# Patient Record
Sex: MALE | Race: WHITE | NOT HISPANIC OR LATINO
[De-identification: names, ages, dates, MRNs, and addresses within clinical notes are randomized per-mention and may not be internally consistent; named-entity substitution may affect disease eponyms.]

---

## 2017-01-09 ENCOUNTER — APPOINTMENT (OUTPATIENT)
Dept: CARDIOLOGY | Facility: CLINIC | Age: 72
End: 2017-01-09

## 2017-01-19 ENCOUNTER — APPOINTMENT (OUTPATIENT)
Dept: CARDIOLOGY | Facility: CLINIC | Age: 72
End: 2017-01-19

## 2017-03-15 ENCOUNTER — APPOINTMENT (OUTPATIENT)
Dept: CARDIOLOGY | Facility: CLINIC | Age: 72
End: 2017-03-15

## 2017-06-16 ENCOUNTER — APPOINTMENT (OUTPATIENT)
Dept: CARDIOLOGY | Facility: CLINIC | Age: 72
End: 2017-06-16

## 2017-10-06 ENCOUNTER — APPOINTMENT (OUTPATIENT)
Dept: CARDIOLOGY | Facility: CLINIC | Age: 72
End: 2017-10-06
Payer: COMMERCIAL

## 2017-10-06 PROCEDURE — 93280 PM DEVICE PROGR EVAL DUAL: CPT

## 2018-01-05 ENCOUNTER — APPOINTMENT (OUTPATIENT)
Dept: CARDIOLOGY | Facility: CLINIC | Age: 73
End: 2018-01-05

## 2018-01-17 ENCOUNTER — APPOINTMENT (OUTPATIENT)
Dept: CARDIOLOGY | Facility: CLINIC | Age: 73
End: 2018-01-17
Payer: COMMERCIAL

## 2018-01-17 VITALS
DIASTOLIC BLOOD PRESSURE: 64 MMHG | HEIGHT: 73 IN | HEART RATE: 67 BPM | WEIGHT: 174 LBS | BODY MASS INDEX: 23.06 KG/M2 | OXYGEN SATURATION: 95 % | SYSTOLIC BLOOD PRESSURE: 102 MMHG

## 2018-01-17 DIAGNOSIS — Z82.49 FAMILY HISTORY OF ISCHEMIC HEART DISEASE AND OTHER DISEASES OF THE CIRCULATORY SYSTEM: ICD-10-CM

## 2018-01-17 DIAGNOSIS — Z78.9 OTHER SPECIFIED HEALTH STATUS: ICD-10-CM

## 2018-01-17 DIAGNOSIS — Z80.1 FAMILY HISTORY OF MALIGNANT NEOPLASM OF TRACHEA, BRONCHUS AND LUNG: ICD-10-CM

## 2018-01-17 DIAGNOSIS — Z80.0 FAMILY HISTORY OF MALIGNANT NEOPLASM OF DIGESTIVE ORGANS: ICD-10-CM

## 2018-01-17 PROCEDURE — 99214 OFFICE O/P EST MOD 30 MIN: CPT

## 2018-01-19 ENCOUNTER — APPOINTMENT (OUTPATIENT)
Dept: CARDIOLOGY | Facility: CLINIC | Age: 73
End: 2018-01-19
Payer: COMMERCIAL

## 2018-01-19 PROCEDURE — 93306 TTE W/DOPPLER COMPLETE: CPT

## 2018-01-24 ENCOUNTER — APPOINTMENT (OUTPATIENT)
Dept: CARDIOLOGY | Facility: CLINIC | Age: 73
End: 2018-01-24

## 2018-01-25 ENCOUNTER — APPOINTMENT (OUTPATIENT)
Dept: CARDIOLOGY | Facility: CLINIC | Age: 73
End: 2018-01-25
Payer: COMMERCIAL

## 2018-01-25 PROCEDURE — 93280 PM DEVICE PROGR EVAL DUAL: CPT

## 2018-04-24 ENCOUNTER — APPOINTMENT (OUTPATIENT)
Dept: CARDIOLOGY | Facility: CLINIC | Age: 73
End: 2018-04-24
Payer: COMMERCIAL

## 2018-04-24 PROCEDURE — 93280 PM DEVICE PROGR EVAL DUAL: CPT

## 2018-04-24 RX ORDER — UBIDECARENONE/VIT E ACET 100MG-5
1000 CAPSULE ORAL
Refills: 0 | Status: ACTIVE | COMMUNITY

## 2018-05-29 ENCOUNTER — MEDICATION RENEWAL (OUTPATIENT)
Age: 73
End: 2018-05-29

## 2018-07-09 ENCOUNTER — MEDICATION RENEWAL (OUTPATIENT)
Age: 73
End: 2018-07-09

## 2018-07-09 ENCOUNTER — RECORD ABSTRACTING (OUTPATIENT)
Age: 73
End: 2018-07-09

## 2018-08-01 ENCOUNTER — APPOINTMENT (OUTPATIENT)
Dept: CARDIOLOGY | Facility: CLINIC | Age: 73
End: 2018-08-01
Payer: COMMERCIAL

## 2018-08-01 VITALS
BODY MASS INDEX: 23.86 KG/M2 | WEIGHT: 180 LBS | HEIGHT: 73 IN | SYSTOLIC BLOOD PRESSURE: 110 MMHG | DIASTOLIC BLOOD PRESSURE: 64 MMHG | HEART RATE: 68 BPM

## 2018-08-01 PROCEDURE — 99214 OFFICE O/P EST MOD 30 MIN: CPT

## 2018-08-01 PROCEDURE — 93280 PM DEVICE PROGR EVAL DUAL: CPT

## 2018-08-01 RX ORDER — ATENOLOL 50 MG/1
50 TABLET ORAL DAILY
Qty: 90 | Refills: 2 | Status: DISCONTINUED | COMMUNITY
Start: 2018-07-09 | End: 2018-08-01

## 2018-08-01 RX ORDER — ATORVASTATIN CALCIUM 40 MG/1
40 TABLET, FILM COATED ORAL DAILY
Qty: 90 | Refills: 1 | Status: DISCONTINUED | COMMUNITY
End: 2018-08-01

## 2018-10-15 ENCOUNTER — MEDICATION RENEWAL (OUTPATIENT)
Age: 73
End: 2018-10-15

## 2018-10-29 ENCOUNTER — RX RENEWAL (OUTPATIENT)
Age: 73
End: 2018-10-29

## 2018-11-05 ENCOUNTER — MEDICATION RENEWAL (OUTPATIENT)
Age: 73
End: 2018-11-05

## 2018-11-06 RX ORDER — ALLOPURINOL 100 MG/1
100 TABLET ORAL DAILY
Qty: 30 | Refills: 0 | Status: ACTIVE | COMMUNITY
Start: 1900-01-01 | End: 1900-01-01

## 2018-11-09 ENCOUNTER — APPOINTMENT (OUTPATIENT)
Dept: CARDIOLOGY | Facility: CLINIC | Age: 73
End: 2018-11-09
Payer: COMMERCIAL

## 2018-11-09 PROCEDURE — 93280 PM DEVICE PROGR EVAL DUAL: CPT

## 2018-11-09 NOTE — PROCEDURE
[Pacemaker] : pacemaker [DDDR] : DDDR [Threshold Testing Performed] : Threshold testing was performed [Lead Imp:  ___ohms] : lead impedance was [unfilled] ohms [Sensing Amplitude ___mv] : sensing amplitude was [unfilled] mv [___V @] : [unfilled] V [___ ms] : [unfilled] ms [Counters Reset] : the counters were reset [de-identified] : [facility] [de-identified] : Iron [id number] [de-identified] : 3/4/13 [de-identified] :  [de-identified] : 10 months [de-identified] : AP: 43%\par : 100%\par \par 1 recorded HVR, at a rate of 120bpm. Patient  asymptomatic. \par 1:1 AV conduction\par \par PPM interrogation in 3 months\par \par \par Sincerely,\par \par Suyapa Brown PA-C\par Supervising MD: Dr. Radha Mills

## 2018-12-05 ENCOUNTER — TRANSCRIPTION ENCOUNTER (OUTPATIENT)
Age: 73
End: 2018-12-05

## 2018-12-05 ENCOUNTER — MEDICATION RENEWAL (OUTPATIENT)
Age: 73
End: 2018-12-05

## 2019-01-16 ENCOUNTER — APPOINTMENT (OUTPATIENT)
Dept: CARDIOLOGY | Facility: CLINIC | Age: 74
End: 2019-01-16

## 2019-02-12 ENCOUNTER — APPOINTMENT (OUTPATIENT)
Dept: CARDIOLOGY | Facility: CLINIC | Age: 74
End: 2019-02-12
Payer: COMMERCIAL

## 2019-02-12 PROCEDURE — 93280 PM DEVICE PROGR EVAL DUAL: CPT

## 2019-02-12 PROCEDURE — 93306 TTE W/DOPPLER COMPLETE: CPT

## 2019-02-12 NOTE — PROCEDURE
[Pacemaker] : pacemaker [DDDR] : DDDR [Threshold Testing Performed] : Threshold testing was performed [Lead Imp:  ___ohms] : lead impedance was [unfilled] ohms [Sensing Amplitude ___mv] : sensing amplitude was [unfilled] mv [___V @] : [unfilled] V [___ ms] : [unfilled] ms [Counters Reset] : the counters were reset [de-identified] : [facility] [de-identified] : Iron K173 [de-identified] : [id number] [de-identified] : 3/4/13 [de-identified] :  [de-identified] : 5 months [de-identified] : AP: 50%\par : 100%\par \par PPM interrogation in 3 months\par \par \par Sincerely,\par \par Suyapa Brown PA-C\par Supervising MD: Dr. Radha Mills

## 2019-02-18 ENCOUNTER — RECORD ABSTRACTING (OUTPATIENT)
Age: 74
End: 2019-02-18

## 2019-02-19 ENCOUNTER — APPOINTMENT (OUTPATIENT)
Dept: CARDIOLOGY | Facility: CLINIC | Age: 74
End: 2019-02-19
Payer: COMMERCIAL

## 2019-02-25 ENCOUNTER — APPOINTMENT (OUTPATIENT)
Dept: CARDIOLOGY | Facility: CLINIC | Age: 74
End: 2019-02-25

## 2019-03-05 ENCOUNTER — RX RENEWAL (OUTPATIENT)
Age: 74
End: 2019-03-05

## 2019-04-10 ENCOUNTER — APPOINTMENT (OUTPATIENT)
Dept: CARDIOLOGY | Facility: CLINIC | Age: 74
End: 2019-04-10
Payer: COMMERCIAL

## 2019-04-10 VITALS
HEIGHT: 72 IN | DIASTOLIC BLOOD PRESSURE: 70 MMHG | BODY MASS INDEX: 24.79 KG/M2 | WEIGHT: 183 LBS | OXYGEN SATURATION: 97 % | SYSTOLIC BLOOD PRESSURE: 110 MMHG | HEART RATE: 80 BPM

## 2019-04-10 DIAGNOSIS — Z87.898 PERSONAL HISTORY OF OTHER SPECIFIED CONDITIONS: ICD-10-CM

## 2019-04-10 DIAGNOSIS — R93.1 ABNORMAL FINDINGS ON DIAGNOSTIC IMAGING OF HEART AND CORONARY CIRCULATION: ICD-10-CM

## 2019-04-10 PROCEDURE — 99214 OFFICE O/P EST MOD 30 MIN: CPT

## 2019-04-10 NOTE — REASON FOR VISIT
[Follow-Up - Clinic] : a clinic follow-up of [Atrial Fibrillation] : atrial fibrillation [Hyperlipidemia] : hyperlipidemia [Hypertension] : hypertension [FreeTextEntry1] : 73-year-old male is seen in followup consultation due to review echocardiogramlabs \par He has been more sedentary during the winter months.\par No chest pain. No PND, orthopnea.\par Limited functional status due to significant  arthritis.\par He has no PND, orthopnea.\par No pedal edema.\par No palpitation, dizziness, or syncopal episode.\par Stable. Weight.\par Stable. Diet.\par No bleeding complications from his anticoagulation\par No recent hospital admission.\par

## 2019-04-10 NOTE — ASSESSMENT
[FreeTextEntry1] : past tests for reference:\par carotid doppler study 2009. mild atherosclerosis\par Nuclear stress test 10/6/2015 performed with Lexiscan.  Nondiagnostic for ischemia by EKG due to AV pacing at rest.  Normal left ventricular ejection fraction.  Wall motion demonstrated mild hypokinesis in the inferior region and apical region.  No significant change when noted compared to the report on January 24, 2014.\par Echocardiogram 10/6/2015 be a 60%, normal left atrial and ventricular size, normal right atrial and ventricular size, trace mitral regurgitation, no aortic insufficiency, trace pulmonic insufficiency, trace tricuspid regurgitation.  No significant changes from prior study.\par EKG ordered and interpreted by me on January 19, 2017. Indication presence of permanent pacemaker. Paroxysmal atrial fibrillation. Into position. AV dual paced rhythm.\par Echocardiogram. Done on January 7, 2017. LV ejection fraction was 60%\par \par Reviewed on April 10, 2019\par Labs from January 2, 2019 was reviewed. Elevated triglycerides were noted. Otherwise, stable, CMP, and LDL cholesterol.\par Echocardiogram. February 12, 2019. LV ejection fraction low normal to 50%. LVIDD 4.0 cm. There is mild mitral regurgitation. Normal right ventricular size and function. No pericardial effusion. Normal pulmonary pressures.\par \par

## 2019-04-10 NOTE — PHYSICAL EXAM
[General Appearance - Well Developed] : well developed [Normal Appearance] : normal appearance [Well Groomed] : well groomed [No Deformities] : no deformities [General Appearance - Well Nourished] : well nourished [General Appearance - In No Acute Distress] : no acute distress [Normal Conjunctiva] : the conjunctiva exhibited no abnormalities [Eyelids - No Xanthelasma] : the eyelids demonstrated no xanthelasmas [Normal Jugular Venous A Waves Present] : normal jugular venous A waves present [Normal Jugular Venous V Waves Present] : normal jugular venous V waves present [No Jugular Venous Marie A Waves] : no jugular venous marie A waves [Respiration, Rhythm And Depth] : normal respiratory rhythm and effort [Exaggerated Use Of Accessory Muscles For Inspiration] : no accessory muscle use [Heart Rate And Rhythm] : heart rate and rhythm were normal [Auscultation Breath Sounds / Voice Sounds] : lungs were clear to auscultation bilaterally [Heart Sounds] : normal S1 and S2 [Murmurs] : no murmurs present [Arterial Pulses Normal] : the arterial pulses were normal [Edema] : no peripheral edema present [Abdomen Soft] : soft [Veins - Varicosity Changes] : no varicosital changes were noted in the lower extremities [FreeTextEntry1] : No gallop, or rub, heave. Unable to palpate PMI.\par Decreased carotid upstroke. [Abdomen Tenderness] : non-tender [Nail Clubbing] : no clubbing of the fingernails [Cyanosis, Localized] : no localized cyanosis [Abnormal Walk] : normal gait [Skin Color & Pigmentation] : normal skin color and pigmentation [] : no rash [Oriented To Time, Place, And Person] : oriented to person, place, and time [Affect] : the affect was normal [No Anxiety] : not feeling anxious [Mood] : the mood was normal

## 2019-04-10 NOTE — HISTORY OF PRESENT ILLNESS
[FreeTextEntry1] : \par 72-year-old gentleman. Comes in for followup on January 17, 2018.\par  Besides osteoarthritic pain of his hip. He offers no complaint of chest pain. No PND. No orthopnea. No palpitations. No dizziness or lightheadedness. According to him symptoms of moderate severity. Increases with activity. Improves her progress. Progressively getting worse. Has seen orthopedic surgery the past.\par No recent hospital admission for acute coronary syndrome, CHF, syncopal episode.\par Permanent pacemaker is intubated on a regular basis.\par No bleeding complication on anticoagulation.\par His other active medical problems as noted below\par \par • History of atrial fibrillation/flutter, status post ablation,  no recurrent episodes.  off amiodarone. on long-term anticoagulation with Eliquis at present. no bleeding complications.\par  permanent pacemaker. normally functioning. checked in 2016\par \par •Essential hypertension, stable on atenolol.No CHF.  No CK D.  Nonsmoker.\par \par •Vitamin D deficiency and renal stone disease, being managed through your office. No recurrent symptoms.\par

## 2019-04-10 NOTE — DISCUSSION/SUMMARY
[FreeTextEntry1] : Assessment and suggestions.\par \par #1 Echocardiogram showing low normal LV ejection fraction. This is decreased from 60% about a year ago. No significant LV dilation. No significant signs of ischemic heart disease. He does have 100% right ventricular pacing. We will recheck his LV ejection fraction in 6 months. If there is further reduction or dilate dictation of LV then consider ischemic evaluation followed by upgrading device to biventricular pacing.\par #2NSVT. Preserved systolic function. No signs of unstable CAD.\par LV ejection fraction will be repeated again as planned. Asymptomatic at present. The\par #3 paroxysmal atrial fibrillation/flutter/sick sinus syndrome. Status post permanent pacemaker implantation and ablation. On long-term anticoagulation. No bleeding complication. CBC, CMP ordered.\par Permanent pacemaker evaluation in the office.\par Watch for bleeding.\par #4 hyperlipidemia. On atorvastatin. Tolerating it well.Hypertriglyceridemia reviewed. Low carbohydrate diet recommended.\par #5 essential hypertension. No congestive heart failure. No history of renal insufficiency. Follow labs. Continue present medications.\par #6 preventive care with your office.Labs are being done in your office, I would appreciate if I can get copy of it.\par \par Counseling regarding low saturated fat, salt and carbohydrate intake was reviewed. Active lifestyle and regular. Exercise along with weight management is advised.\par \par \par All the above were at length reviewed. Answered all the questions. Thank you very much for this kind referral. Please do not hesitate to give me a call for any question.\par Part of this transcription was done with voice recognition software and phonetically similar errors are common. I apologize for that. Please donot hesitate to call for any questions due to above.\par \par f/u 6 months\par

## 2019-04-10 NOTE — REVIEW OF SYSTEMS
[Joint Pain] : joint pain [Joint Stiffness] : joint stiffness [Joint Swelling] : no joint swelling [Negative] : Heme/Lymph

## 2019-05-13 ENCOUNTER — APPOINTMENT (OUTPATIENT)
Dept: CARDIOLOGY | Facility: CLINIC | Age: 74
End: 2019-05-13
Payer: COMMERCIAL

## 2019-05-13 DIAGNOSIS — I48.92 UNSPECIFIED ATRIAL FLUTTER: ICD-10-CM

## 2019-05-13 PROCEDURE — 93280 PM DEVICE PROGR EVAL DUAL: CPT

## 2019-05-13 NOTE — PROCEDURE
[DDDR] : DDDR [Pacemaker] : pacemaker [Threshold Testing Performed] : Threshold testing was performed [Lead Imp:  ___ohms] : lead impedance was [unfilled] ohms [Sensing Amplitude ___mv] : sensing amplitude was [unfilled] mv [___V @] : [unfilled] V [___ ms] : [unfilled] ms [Counters Reset] : the counters were reset [de-identified] : [facility] [de-identified] : Iron K173 [de-identified] : [id number] [de-identified] : 3/4/13 [de-identified] :  [de-identified] : <3 months [de-identified] : AP: 36%\par : 100%\par \par Close monitoring of battery \par PPM interrogation in 6 weeks\par \par \par Sincerely,\par \par Suyapa Brown PA-C\par Supervising MD: Dr. Radha Mills

## 2019-06-04 ENCOUNTER — MEDICATION RENEWAL (OUTPATIENT)
Age: 74
End: 2019-06-04

## 2019-07-02 ENCOUNTER — APPOINTMENT (OUTPATIENT)
Dept: CARDIOLOGY | Facility: CLINIC | Age: 74
End: 2019-07-02
Payer: COMMERCIAL

## 2019-07-02 PROCEDURE — 93280 PM DEVICE PROGR EVAL DUAL: CPT

## 2019-07-02 NOTE — PROCEDURE
[Pacemaker] : pacemaker [DDDR] : DDDR [Threshold Testing Performed] : Threshold testing was not performed [Counters Reset] : the counters were reset [de-identified] : [facility] [de-identified] : Iron K173 [de-identified] : [id number] [de-identified] : 3/4/13 [de-identified] :  [de-identified] : CARLOTTA has been triggered on 6/5/19. [de-identified] : AP: 38%\par : 100%\par \par CARLOTTA has been triggered on 6/5/19.  He will be scheduled for replacement within the next 30 days.

## 2019-07-17 ENCOUNTER — MEDICATION RENEWAL (OUTPATIENT)
Age: 74
End: 2019-07-17

## 2019-07-17 ENCOUNTER — OUTPATIENT (OUTPATIENT)
Dept: OUTPATIENT SERVICES | Facility: HOSPITAL | Age: 74
LOS: 1 days | End: 2019-07-17
Payer: COMMERCIAL

## 2019-07-17 DIAGNOSIS — Z79.2 LONG TERM (CURRENT) USE OF ANTIBIOTICS: ICD-10-CM

## 2019-07-17 PROCEDURE — 71046 X-RAY EXAM CHEST 2 VIEWS: CPT | Mod: 26

## 2019-07-18 PROCEDURE — 93010 ELECTROCARDIOGRAM REPORT: CPT

## 2019-07-30 ENCOUNTER — OUTPATIENT (OUTPATIENT)
Dept: OUTPATIENT SERVICES | Facility: HOSPITAL | Age: 74
LOS: 1 days | End: 2019-07-30
Payer: COMMERCIAL

## 2019-07-30 PROCEDURE — 33228 REMV&REPLC PM GEN DUAL LEAD: CPT

## 2019-08-08 ENCOUNTER — APPOINTMENT (OUTPATIENT)
Dept: CARDIOLOGY | Facility: CLINIC | Age: 74
End: 2019-08-08
Payer: MEDICARE

## 2019-08-08 VITALS
SYSTOLIC BLOOD PRESSURE: 114 MMHG | OXYGEN SATURATION: 94 % | BODY MASS INDEX: 24.38 KG/M2 | DIASTOLIC BLOOD PRESSURE: 60 MMHG | WEIGHT: 180 LBS | HEIGHT: 72 IN | HEART RATE: 63 BPM

## 2019-08-08 PROCEDURE — 93280 PM DEVICE PROGR EVAL DUAL: CPT

## 2019-08-08 PROCEDURE — 99213 OFFICE O/P EST LOW 20 MIN: CPT

## 2019-08-08 RX ORDER — CEFUROXIME AXETIL 500 MG/1
500 TABLET ORAL
Qty: 10 | Refills: 0 | Status: DISCONTINUED | COMMUNITY
Start: 2019-07-17 | End: 2019-08-08

## 2019-08-08 NOTE — PHYSICAL EXAM
[Left Infraclavicular] : left infraclavicular area [Clean] : clean [Dry] : dry [Healing Well] : healing well [Bleeding] : no active bleeding [Purulent Drainage] : no purulent drainage [Erythema] : not erythematous [Warm] : not warm

## 2019-08-08 NOTE — PROCEDURE
[Pacemaker] : pacemaker [Voltage: ___ volts] : Voltage was [unfilled] volts [DDD] : DDD [Longevity: ___ months] : The estimated remaining battery life is [unfilled] months [Lead Imp:  ___ohms] : lead impedance was [unfilled] ohms [Sensing Amplitude ___mv] : sensing amplitude was [unfilled] mv [___V @] : [unfilled] V [___ ms] : [unfilled] ms [None] : none [Apace-Vpace ___ %] : Apace-Vpace [unfilled]% [Counters Reset] : the counters were reset [Threshold Testing Performed] : Threshold testing was not performed [de-identified] : [employer] [de-identified] : Radha LINDSEY DR MRI [id number] [de-identified] : [id number] [de-identified] : 7-30-19 [de-identified] :  [de-identified] : CARLOTTA has been triggered on 6/5/19. [de-identified] : settings\par \par atrium \par sensing 0.30mv\par amplitude 2.25v (auto)\par duration 0.40ms (auto)\par \par ventricle\par sensing 1.20mv \par ampltude 4.25v (auto)\par duration 0.40ms (auto)\par \par Episodes\par none\par \par pt seen by PV and RP today. \par called get connected and bedside monitor ordered. Pt aware if he does not receive n 2 weeks to call. SAP apt. \par Next ppm interrogation 3 months. \par \par Sincerely,\par \par Althea Beltrán PA-C\par patient reviewed and plan advised by supervising physician Dr. Galvez\par \par

## 2019-08-08 NOTE — REASON FOR VISIT
[Follow-Up - Clinic] : a clinic follow-up of [Atrial Fibrillation] : atrial fibrillation [Hyperlipidemia] : hyperlipidemia [Hypertension] : hypertension [FreeTextEntry1] : 74-year-old gentleman comes in for followup consultation after recent pulse generator change.\par No complications.No significant pain at the site of surgery. No rash or swelling. No discharge. No fever or chills.\par Activity in the form of golfing again without any significant symptoms\par No chest pain. No PND, orthopnea.\par No pedal edema.\par No palpitation, dizziness, or syncopal episode.\par Stable. Weight.\par Stable. Diet.\par No bleeding complications from his anticoagulation\par No recent hospital admission.\par

## 2019-08-08 NOTE — PHYSICAL EXAM
[General Appearance - Well Developed] : well developed [Well Groomed] : well groomed [Normal Appearance] : normal appearance [General Appearance - In No Acute Distress] : no acute distress [No Deformities] : no deformities [General Appearance - Well Nourished] : well nourished [Eyelids - No Xanthelasma] : the eyelids demonstrated no xanthelasmas [Normal Conjunctiva] : the conjunctiva exhibited no abnormalities [Normal Jugular Venous V Waves Present] : normal jugular venous V waves present [Respiration, Rhythm And Depth] : normal respiratory rhythm and effort [Exaggerated Use Of Accessory Muscles For Inspiration] : no accessory muscle use [Murmurs] : no murmurs present [Heart Rate And Rhythm] : heart rate and rhythm were normal [Heart Sounds] : normal S1 and S2 [Arterial Pulses Normal] : the arterial pulses were normal [Edema] : no peripheral edema present [Veins - Varicosity Changes] : no varicosital changes were noted in the lower extremities [Abnormal Walk] : normal gait [Cyanosis, Localized] : no localized cyanosis [Nail Clubbing] : no clubbing of the fingernails [Skin Color & Pigmentation] : normal skin color and pigmentation [] : no rash [Oriented To Time, Place, And Person] : oriented to person, place, and time [Mood] : the mood was normal [Affect] : the affect was normal [No Anxiety] : not feeling anxious [FreeTextEntry1] : No gallop, or rub, heave. Unable to palpate PMI. Surgical site is clean. No signs of infection or hematoma\par Decreased carotid upstroke.

## 2019-08-08 NOTE — DISCUSSION/SUMMARY
[FreeTextEntry1] : Assessment and suggestions.\par \par #1pulse generator change was made. No complications. Post procedure orders were reviewed. Continue to follow pacemaker on a regular basis.\par #2NSVT. Preserved systolic function. No signs of unstable CAD.\par LV ejection fraction will be repeated again as planned. Asymptomatic at present. The\par #3 paroxysmal atrial fibrillation/flutter/sick sinus syndrome. Status post permanent pacemaker implantation and ablation. On long-term anticoagulation. \par Permanent pacemaker evaluation in the office.\par Watch for bleeding.\par #4 hyperlipidemia. On atorvastatin. Tolerating it well.Hypertriglyceridemia reviewed. Low carbohydrate diet recommended.\par #5 essential hypertension. No congestive heart failure. No history of renal insufficiency. Follow labs. Continue present medications.\par #6 preventive care with your office.Labs are being done in your office, I would appreciate if I can get copy of it.\par \par Counseling regarding low saturated fat, salt and carbohydrate intake was reviewed. Active lifestyle and regular. Exercise along with weight management is advised.\par \par \par All the above were at length reviewed. Answered all the questions. Thank you very much for this kind referral. Please do not hesitate to give me a call for any question.\par Part of this transcription was done with voice recognition software and phonetically similar errors are common. I apologize for that. Please donot hesitate to call for any questions due to above.\par \par f/u 6 months\par

## 2019-08-08 NOTE — REVIEW OF SYSTEMS
[Joint Pain] : joint pain [Joint Stiffness] : joint stiffness [Negative] : Endocrine [Joint Swelling] : no joint swelling

## 2019-08-08 NOTE — HISTORY OF PRESENT ILLNESS
[FreeTextEntry1] : \par His other active medical problems as noted below\par \par • History of atrial fibrillation/flutter, status post ablation,  no recurrent episodes.  off amiodarone. on long-term anticoagulation with Eliquis at present. no bleeding complications.\par  permanent pacemaker. normally functioning. checked in 2016\par \par •Essential hypertension, stable on atenolol.No CHF.  No CK D.  Nonsmoker.\par \par •Vitamin D deficiency and renal stone disease, being managed through your office. No recurrent symptoms.\par

## 2019-08-08 NOTE — PHYSICAL EXAM
[General Appearance - Well Developed] : well developed [Well Groomed] : well groomed [Normal Appearance] : normal appearance [General Appearance - Well Nourished] : well nourished [General Appearance - In No Acute Distress] : no acute distress [No Deformities] : no deformities [Eyelids - No Xanthelasma] : the eyelids demonstrated no xanthelasmas [Normal Conjunctiva] : the conjunctiva exhibited no abnormalities [Normal Jugular Venous V Waves Present] : normal jugular venous V waves present [Respiration, Rhythm And Depth] : normal respiratory rhythm and effort [Exaggerated Use Of Accessory Muscles For Inspiration] : no accessory muscle use [Murmurs] : no murmurs present [Heart Sounds] : normal S1 and S2 [Heart Rate And Rhythm] : heart rate and rhythm were normal [Arterial Pulses Normal] : the arterial pulses were normal [Edema] : no peripheral edema present [Veins - Varicosity Changes] : no varicosital changes were noted in the lower extremities [Abnormal Walk] : normal gait [Nail Clubbing] : no clubbing of the fingernails [Cyanosis, Localized] : no localized cyanosis [Oriented To Time, Place, And Person] : oriented to person, place, and time [Skin Color & Pigmentation] : normal skin color and pigmentation [] : no rash [Mood] : the mood was normal [Affect] : the affect was normal [No Anxiety] : not feeling anxious [FreeTextEntry1] : No gallop, or rub, heave. Unable to palpate PMI. Surgical site is clean. No signs of infection or hematoma\par Decreased carotid upstroke.

## 2019-08-13 ENCOUNTER — APPOINTMENT (OUTPATIENT)
Dept: CARDIOLOGY | Facility: CLINIC | Age: 74
End: 2019-08-13

## 2019-08-20 ENCOUNTER — APPOINTMENT (OUTPATIENT)
Dept: CARDIOLOGY | Facility: CLINIC | Age: 74
End: 2019-08-20

## 2019-09-06 ENCOUNTER — APPOINTMENT (OUTPATIENT)
Dept: CARDIOLOGY | Facility: CLINIC | Age: 74
End: 2019-09-06

## 2019-10-08 ENCOUNTER — APPOINTMENT (OUTPATIENT)
Dept: CARDIOLOGY | Facility: CLINIC | Age: 74
End: 2019-10-08
Payer: MEDICARE

## 2019-10-08 PROCEDURE — 93308 TTE F-UP OR LMTD: CPT

## 2019-10-08 PROCEDURE — 93280 PM DEVICE PROGR EVAL DUAL: CPT

## 2019-10-08 NOTE — PHYSICAL EXAM
[Left Infraclavicular] : left infraclavicular area [Clean] : clean [Dry] : dry [Healing Well] : healing well [Purulent Drainage] : no purulent drainage [Bleeding] : no active bleeding [Erythema] : not erythematous [Warm] : not warm

## 2019-10-08 NOTE — PROCEDURE
[Pacemaker] : pacemaker [DDD] : DDD [Voltage: ___ volts] : Voltage was [unfilled] volts [Sensing Amplitude ___mv] : sensing amplitude was [unfilled] mv [Lead Imp:  ___ohms] : lead impedance was [unfilled] ohms [___V @] : [unfilled] V [___ ms] : [unfilled] ms [None] : none [Counters Reset] : the counters were reset [Apace-Vpace ___ %] : Apace-Vpace [unfilled]% [Threshold Testing Performed] : Threshold testing was not performed [de-identified] : [employer] [de-identified] : Radha LINDSEY DR MRI [id number] [de-identified] : [id number] [de-identified] : 7-30-19 [de-identified] :  [de-identified] : settings\par \par atrium \par sensing 0.30mv\par amplitude 2.25v (auto)\par duration 0.40ms (auto)\par \par ventricle\par sensing 1.20mv \par amplitude 4.25v (auto)\par duration 0.40ms (auto)\par \par Episodes\par none\par \par Patient has home monitor which he has not activated. Reviewed role of home monitoring and at this time he does not want to activate monitor\par \par Sincerely,\par \par Suyapa Brown PA-C\par Patients history, testing and plan reviewed with supervising MD: Dr. Des Galvez \par \par \par

## 2019-11-14 ENCOUNTER — APPOINTMENT (OUTPATIENT)
Dept: CARDIOLOGY | Facility: CLINIC | Age: 74
End: 2019-11-14

## 2020-01-14 ENCOUNTER — APPOINTMENT (OUTPATIENT)
Dept: CARDIOLOGY | Facility: CLINIC | Age: 75
End: 2020-01-14
Payer: MEDICARE

## 2020-01-14 PROCEDURE — 93280 PM DEVICE PROGR EVAL DUAL: CPT

## 2020-01-28 ENCOUNTER — APPOINTMENT (OUTPATIENT)
Dept: CARDIOLOGY | Facility: CLINIC | Age: 75
End: 2020-01-28

## 2020-03-04 ENCOUNTER — APPOINTMENT (OUTPATIENT)
Dept: CARDIOLOGY | Facility: CLINIC | Age: 75
End: 2020-03-04
Payer: MEDICARE

## 2020-03-04 PROCEDURE — 93015 CV STRESS TEST SUPVJ I&R: CPT

## 2020-03-04 PROCEDURE — A9502: CPT

## 2020-03-04 PROCEDURE — 93306 TTE W/DOPPLER COMPLETE: CPT

## 2020-03-04 PROCEDURE — 78452 HT MUSCLE IMAGE SPECT MULT: CPT

## 2020-03-10 ENCOUNTER — APPOINTMENT (OUTPATIENT)
Dept: CARDIOLOGY | Facility: CLINIC | Age: 75
End: 2020-03-10
Payer: MEDICARE

## 2020-03-10 ENCOUNTER — NON-APPOINTMENT (OUTPATIENT)
Age: 75
End: 2020-03-10

## 2020-03-10 VITALS
WEIGHT: 180 LBS | SYSTOLIC BLOOD PRESSURE: 128 MMHG | BODY MASS INDEX: 23.86 KG/M2 | HEART RATE: 60 BPM | DIASTOLIC BLOOD PRESSURE: 70 MMHG | HEIGHT: 73 IN | OXYGEN SATURATION: 96 %

## 2020-03-10 DIAGNOSIS — Z48.89 ENCOUNTER FOR OTHER SPECIFIED SURGICAL AFTERCARE: ICD-10-CM

## 2020-03-10 PROCEDURE — 93000 ELECTROCARDIOGRAM COMPLETE: CPT

## 2020-03-10 PROCEDURE — 99215 OFFICE O/P EST HI 40 MIN: CPT

## 2020-03-10 RX ORDER — ESZOPICLONE 2 MG/1
2 TABLET, FILM COATED ORAL
Qty: 30 | Refills: 0 | Status: DISCONTINUED | COMMUNITY
Start: 2018-04-09 | End: 2020-03-10

## 2020-03-10 RX ORDER — OMEPRAZOLE 40 MG/1
40 CAPSULE, DELAYED RELEASE ORAL DAILY
Refills: 0 | Status: ACTIVE | COMMUNITY

## 2020-03-10 NOTE — DISCUSSION/SUMMARY
[FreeTextEntry1] : Assessment and suggestions.\par \par #1  Presumptive diagnosis of ischemic cardiomyopathy with mildly reduced LV ejection fraction.  Abnormal finding on echocardiogram and nuclear myocardial perfusion scan in presence of small area of scarring mainly involving apex without significant symptoms, overall mildly reduced to preserved LV systolic function, no signs of congestive heart failure.\par Reviewed pathophysiology.  Possibility of silent infarction versus abnormality in relation to right ventricular apical pacing was reviewed with him.\par Considering he is already on beta-blockers after reviewing risk and benefits I have added valsartan 40 mg to the present regimen.  We have discussed about remodeling of the heart in relation to RV pacing and possible apical scar.  If continued worsening LV systolic function and dimension may need to consider biventricular pacing.\par If any significant side effects he will contact me\par He will get the labs as advised.\par #2NSVT.  No significant recurrence at present.  No syncope.  Continue beta-blockers.  Management of presumptive ischemic cardiomyopathy as noted above.\par #3 paroxysmal atrial fibrillation/flutter/sick sinus syndrome. Status post permanent pacemaker implantation and ablation. On long-term anticoagulation.  High risk medication use.  Follow labs regularly.\par Permanent pacemaker evaluation in the office.\par Watch for bleeding.\par #4 hyperlipidemia. On atorvastatin. Tolerating it well.Hypertriglyceridemia reviewed. Low carbohydrate diet recommended.\par #5 essential hypertension. No congestive heart failure. No history of renal insufficiency. Follow labs. Continue present medications.\par #6 preventive care with your office.Labs are being done in your office,\par \par Counseling regarding low saturated fat, salt and carbohydrate intake was reviewed. Active lifestyle and regular. Exercise along with weight management is advised.\par \par All the above were at length reviewed. Answered all the questions. Thank you very much for this kind referral. Please do not hesitate to give me a call for any question.\par Part of this transcription was done with voice recognition software and phonetically similar errors are common. I apologize for that. Please donot hesitate to call for any questions due to above.\par \par f/u 6 months\par

## 2020-03-10 NOTE — REASON FOR VISIT
[Follow-Up - Clinic] : a clinic follow-up of [Atrial Fibrillation] : atrial fibrillation [Hyperlipidemia] : hyperlipidemia [Hypertension] : hypertension [FreeTextEntry1] : 74-year-old gentleman comes in for followup consultation to review echocardiogram, nuclear myocardial perfusion scan, labs.\par Activity in the form of golfing again without any significant symptoms\par Exercise limited because of significant orthopedic problems in the form of hip.\par No chest pain. No PND, orthopnea.\par No pedal edema.\par No palpitation, dizziness, or syncopal episode.\par Stable. Weight.\par Stable. Diet.\par No bleeding complications from his anticoagulation\par No recent hospital admission.\par

## 2020-03-10 NOTE — CARDIOLOGY SUMMARY
[No Ischemia] : no Ischemia [No Symptoms] : no Symptoms [Fixed Defect] : fixed defect [___] : [unfilled] [LVEF ___%] : LVEF [unfilled]% [___] : [unfilled] [None] : no pulmonary hypertension [Normal] : normal LA size [Mild] : mild mitral regurgitation

## 2020-03-10 NOTE — PHYSICAL EXAM
[General Appearance - Well Developed] : well developed [Normal Appearance] : normal appearance [Well Groomed] : well groomed [General Appearance - Well Nourished] : well nourished [No Deformities] : no deformities [General Appearance - In No Acute Distress] : no acute distress [Normal Conjunctiva] : the conjunctiva exhibited no abnormalities [Eyelids - No Xanthelasma] : the eyelids demonstrated no xanthelasmas [Normal Jugular Venous V Waves Present] : normal jugular venous V waves present [Respiration, Rhythm And Depth] : normal respiratory rhythm and effort [Exaggerated Use Of Accessory Muscles For Inspiration] : no accessory muscle use [Heart Rate And Rhythm] : heart rate and rhythm were normal [Heart Sounds] : normal S1 and S2 [Murmurs] : no murmurs present [Arterial Pulses Normal] : the arterial pulses were normal [Edema] : no peripheral edema present [Veins - Varicosity Changes] : no varicosital changes were noted in the lower extremities [Abnormal Walk] : normal gait [Nail Clubbing] : no clubbing of the fingernails [Cyanosis, Localized] : no localized cyanosis [Skin Color & Pigmentation] : normal skin color and pigmentation [] : no rash [Oriented To Time, Place, And Person] : oriented to person, place, and time [Affect] : the affect was normal [Mood] : the mood was normal [No Anxiety] : not feeling anxious [FreeTextEntry1] : No gallop, or rub, heave. Unable to palpate PMI. Surgical site is clean. No signs of infection or hematoma\par Decreased carotid upstroke.

## 2020-03-10 NOTE — ASSESSMENT
[FreeTextEntry1] : past tests for reference:\par carotid doppler study 2009. mild atherosclerosis\par Nuclear stress test 10/6/2015 performed with [employer].  Nondiagnostic for ischemia by EKG due to AV pacing at rest.  Normal left ventricular ejection fraction.  Wall motion demonstrated mild hypokinesis in the inferior region and apical region.  No significant change when noted compared to the report on January 24, 2014.\par Echocardiogram 10/6/2015 be a 60%, normal left atrial and ventricular size, normal right atrial and ventricular size, trace mitral regurgitation, no aortic insufficiency, trace pulmonic insufficiency, trace tricuspid regurgitation.  No significant changes from prior study.\par EKG ordered and interpreted by me on January 19, 2017. Indication presence of permanent pacemaker. Paroxysmal atrial fibrillation. Into position. AV dual paced rhythm.\par Echocardiogram. Done on January 7, 2017. LV ejection fraction was 60%\par \par Reviewed on April 10, 2019\par Labs from January 2, 2019 was reviewed. Elevated triglycerides were noted. Otherwise, stable, CMP, and LDL cholesterol.\par Echocardiogram. February 12, 2019. LV ejection fraction low normal to 50%. LVIDD 4.0 cm. There is mild mitral regurgitation. Normal right ventricular size and function. No pericardial effusion. Normal pulmonary pressures.\par \par Reviewed on March 10, 2020\par Labs from January 2020, echocardiogram March 4, 2020 and nuclear myocardial perfusion scan March 4, 2020 were reviewed\par EKG which was reviewed today shows AV sequential pacing.  The reading on the EKG is wrong.  It should be read as AV sequential pacing.

## 2020-06-22 ENCOUNTER — APPOINTMENT (OUTPATIENT)
Dept: CARDIOLOGY | Facility: CLINIC | Age: 75
End: 2020-06-22

## 2020-07-08 ENCOUNTER — APPOINTMENT (OUTPATIENT)
Dept: CARDIOLOGY | Facility: CLINIC | Age: 75
End: 2020-07-08

## 2020-07-09 ENCOUNTER — APPOINTMENT (OUTPATIENT)
Dept: CARDIOLOGY | Facility: CLINIC | Age: 75
End: 2020-07-09
Payer: MEDICARE

## 2020-07-09 PROCEDURE — 93280 PM DEVICE PROGR EVAL DUAL: CPT

## 2020-07-10 NOTE — PROCEDURE
[Pacemaker] : pacemaker [DDD] : DDD [Voltage: ___ volts] : Voltage was [unfilled] volts [Lead Imp:  ___ohms] : lead impedance was [unfilled] ohms [Sensing Amplitude ___mv] : sensing amplitude was [unfilled] mv [___V @] : [unfilled] V [___ ms] : [unfilled] ms [None] : none [Counters Reset] : the counters were reset [Apace-Vpace ___ %] : Apace-Vpace [unfilled]% [Threshold Testing Performed] : Threshold testing was not performed [de-identified] : Medtronic [de-identified] : Radha LINDSEY DR MRI [id number] [de-identified] : [id number] [de-identified] :  [de-identified] : 7[birth year] [de-identified] : settings\par \par atrium \par sensing 0.30mv\par amplitude 2.0v (auto)\par duration 0.40ms (auto)\par \par ventricle\par sensing 1.20mv \par amplitude 4.5v (auto)\par duration 0.40ms (auto)\par \par Episodes - 1 HVR, short duration, pt asymptomatic\par Echo and nuclear stress test from March\par Awaiting upcoming blood work\par \par Patient has home monitor which he has not activated. Reviewed role of home monitoring and at this time he does not want to activate monitor\par \par Sincerely,\par \par Suyapa Brown PA-C\par Patients history, testing and plan reviewed with supervising MD: Dr. Des Galvez \par \par \par

## 2020-10-08 ENCOUNTER — APPOINTMENT (OUTPATIENT)
Dept: CARDIOLOGY | Facility: CLINIC | Age: 75
End: 2020-10-08
Payer: MEDICARE

## 2020-10-08 VITALS
SYSTOLIC BLOOD PRESSURE: 112 MMHG | OXYGEN SATURATION: 95 % | DIASTOLIC BLOOD PRESSURE: 68 MMHG | BODY MASS INDEX: 23.59 KG/M2 | HEART RATE: 60 BPM | HEIGHT: 73 IN | WEIGHT: 178 LBS

## 2020-10-08 DIAGNOSIS — Z95.0 PRESENCE OF CARDIAC PACEMAKER: ICD-10-CM

## 2020-10-08 PROCEDURE — 99214 OFFICE O/P EST MOD 30 MIN: CPT

## 2020-10-08 PROCEDURE — 93280 PM DEVICE PROGR EVAL DUAL: CPT

## 2020-10-08 RX ORDER — ZOLPIDEM TARTRATE 5 MG/1
5 TABLET ORAL
Qty: 30 | Refills: 0 | Status: DISCONTINUED | COMMUNITY
Start: 2019-02-28 | End: 2020-10-08

## 2020-10-08 RX ORDER — GLUCOSAMINE HCL/CHONDROITIN SU 500-400 MG
CAPSULE ORAL DAILY
Refills: 0 | Status: ACTIVE | COMMUNITY

## 2020-10-08 NOTE — PHYSICAL EXAM
[General Appearance - Well Developed] : well developed [Normal Appearance] : normal appearance [Well Groomed] : well groomed [General Appearance - Well Nourished] : well nourished [No Deformities] : no deformities [General Appearance - In No Acute Distress] : no acute distress [Normal Conjunctiva] : the conjunctiva exhibited no abnormalities [Eyelids - No Xanthelasma] : the eyelids demonstrated no xanthelasmas [Normal Jugular Venous V Waves Present] : normal jugular venous V waves present [Respiration, Rhythm And Depth] : normal respiratory rhythm and effort [Exaggerated Use Of Accessory Muscles For Inspiration] : no accessory muscle use [Heart Rate And Rhythm] : heart rate and rhythm were normal [Heart Sounds] : normal S1 and S2 [Murmurs] : no murmurs present [Arterial Pulses Normal] : the arterial pulses were normal [Edema] : no peripheral edema present [Veins - Varicosity Changes] : no varicosital changes were noted in the lower extremities [FreeTextEntry1] : No gallop, or rub, heave. Unable to palpate PMI. Surgical site is clean. No signs of infection or hematoma\par Decreased carotid upstroke. [Abnormal Walk] : normal gait [Nail Clubbing] : no clubbing of the fingernails [Cyanosis, Localized] : no localized cyanosis [Skin Color & Pigmentation] : normal skin color and pigmentation [] : no rash [Oriented To Time, Place, And Person] : oriented to person, place, and time [Affect] : the affect was normal [Mood] : the mood was normal [No Anxiety] : not feeling anxious

## 2020-10-08 NOTE — HISTORY OF PRESENT ILLNESS
[FreeTextEntry1] : His other active medical problems as noted below\par \par • History of atrial fibrillation/flutter, status post ablation,  no recurrent episodes.  off amiodarone. on long-term anticoagulation with Eliquis at present. no bleeding complications.\par  permanent pacemaker. normally functioning. checked in 2016\par \par •Essential hypertension, stable on atenolol.No CHF.  No CK D.  Nonsmoker.\par \par •Vitamin D deficiency and renal stone disease, being managed through your office. No recurrent symptoms.\par

## 2020-10-08 NOTE — PROCEDURE
[Pacemaker] : pacemaker [DDD] : DDD [Voltage: ___ volts] : Voltage was [unfilled] volts [Threshold Testing Performed] : Threshold testing was not performed [Lead Imp:  ___ohms] : lead impedance was [unfilled] ohms [Sensing Amplitude ___mv] : sensing amplitude was [unfilled] mv [___V @] : [unfilled] V [___ ms] : [unfilled] ms [None] : none [Counters Reset] : the counters were reset [Apace-Vpace ___ %] : Apace-Vpace [unfilled]% [de-identified] : Medtronic [de-identified] : Radha LINDSEY DR MRI [id number] [de-identified] : [id number] [de-identified] : 7-[birth year] [de-identified] :  [de-identified] : settings\par \par atrium \par sensing 0.30mv\par amplitude 2.25v (auto)\par duration 0.40ms (auto)\par \par ventricle\par sensing 1.20mv \par amplitude 4.5v (auto)\par duration 0.40ms (auto)\par \par Patient has home monitor which he has not activated. Reviewed role of home monitoring and at this time he does not want to activate monitor\par \par Sincerely,\par \par Suyapa Brown PA-C\par Patients history, testing and plan reviewed with supervising MD: Dr. Des Galvez \par \par \par

## 2020-10-08 NOTE — DISCUSSION/SUMMARY
[FreeTextEntry1] : Assessment and suggestions.\par \par #1  Presumptive diagnosis of ischemic cardiomyopathy with mildly reduced LV ejection fraction.  Abnormal finding on echocardiogram and nuclear myocardial perfusion scan in presence of small area of scarring mainly involving apex without significant symptoms, overall mildly reduced to preserved LV systolic function, no signs of congestive heart failure.\par Asymptomatic.  Continue aggressive management for lifestyle and risk factor modifications.  No change in clinical status.  If any change in clinical status he will contact us then appropriate further evaluation would include invasive coronary angiography guided therapy.\par #2NSVT.  No significant recurrence at present.  No syncope.  Continue beta-blockers.  Management of presumptive ischemic cardiomyopathy as noted above.\par #3 paroxysmal atrial fibrillation/flutter/sick sinus syndrome. Status post permanent pacemaker implantation and ablation. On long-term anticoagulation.  High risk medication use.  Follow labs regularly.\par Permanent pacemaker evaluation in the office.\par Watch for bleeding.\par #4 hyperlipidemia. On atorvastatin. Tolerating it well.Hypertriglyceridemia reviewed. Low carbohydrate diet recommended.\par #5 essential hypertension. No congestive heart failure. No history of renal insufficiency. Follow labs. Continue present medications.\par #6 preventive care with your office.Labs are being done in your office,\par Labs are ordered.\par \par Counseling regarding low saturated fat, salt and carbohydrate intake was reviewed. Active lifestyle and regular. Exercise along with weight management is advised.\par \par All the above were at length reviewed. Answered all the questions. Thank you very much for this kind referral. Please do not hesitate to give me a call for any question.\par Part of this transcription was done with voice recognition software and phonetically similar errors are common. I apologize for that. Please donot hesitate to call for any questions due to above.\par \par f/u 6 months\par

## 2020-10-08 NOTE — REASON FOR VISIT
[Follow-Up - Clinic] : a clinic follow-up of [Atrial Fibrillation] : atrial fibrillation [Hyperlipidemia] : hyperlipidemia [Hypertension] : hypertension [FreeTextEntry1] : 75-year-old gentleman comes in for followup consultation to review labs.  Pacemaker evaluation.\par Activity in the form of golfing again without any significant symptoms\par Exercise limited because of significant orthopedic problems in the form of hip.\par No chest pain. No PND, orthopnea.\par No pedal edema.\par No palpitation, dizziness, or syncopal episode.\par Stable. Weight.\par Stable. Diet.\par No bleeding complications from his anticoagulation\par No recent hospital admission.\par

## 2020-10-08 NOTE — ASSESSMENT
[FreeTextEntry1] : past tests for reference:\par carotid doppler study 2009. mild atherosclerosis\par Nuclear stress test 10/6/2015 performed with [employer].  Nondiagnostic for ischemia by EKG due to AV pacing at rest.  Normal left ventricular ejection fraction.  Wall motion demonstrated mild hypokinesis in the inferior region and apical region.  No significant change when noted compared to the report on January 24, 2014.\par Echocardiogram 10/6/2015 be a 60%, normal left atrial and ventricular size, normal right atrial and ventricular size, trace mitral regurgitation, no aortic insufficiency, trace pulmonic insufficiency, trace tricuspid regurgitation.  No significant changes from prior study.\par EKG ordered and interpreted by me on January 19, 2017. Indication presence of permanent pacemaker. Paroxysmal atrial fibrillation. Into position. AV dual paced rhythm.\par Echocardiogram. Done on January 7, 2017. LV ejection fraction was 60%\par \par Reviewed on April 10, 2019\par Labs from January 2, 2019 was reviewed. Elevated triglycerides were noted. Otherwise, stable, CMP, and LDL cholesterol.\par Echocardiogram. February 12, 2019. LV ejection fraction low normal to 50%. LVIDD 4.0 cm. There is mild mitral regurgitation. Normal right ventricular size and function. No pericardial effusion. Normal pulmonary pressures.\par \par Reviewed on March 10, 2020\par Labs from January 2020, echocardiogram March 4, 2020 and nuclear myocardial perfusion scan March 4, 2020 were reviewed\par EKG which was reviewed today shows AV sequential pacing.  The reading on the EKG is wrong.  It should be read as AV sequential pacing.\par \par Reviewed on October 8, 2020\par Labs from July 20, 2020 were reviewed\par Pacemaker evaluation today normal functioning noted\par

## 2020-10-08 NOTE — CARDIOLOGY SUMMARY
[No Ischemia] : no Ischemia [No Symptoms] : no Symptoms [Fixed Defect] : fixed defect [___] : [unfilled] [LVEF ___%] : LVEF [unfilled]% [None] : no pulmonary hypertension [Normal] : normal LA size [Mild] : mild mitral regurgitation

## 2021-06-29 ENCOUNTER — APPOINTMENT (OUTPATIENT)
Dept: CARDIOLOGY | Facility: CLINIC | Age: 76
End: 2021-06-29

## 2021-12-29 ENCOUNTER — APPOINTMENT (OUTPATIENT)
Dept: CARDIOLOGY | Facility: CLINIC | Age: 76
End: 2021-12-29
Payer: MEDICARE

## 2021-12-29 VITALS
SYSTOLIC BLOOD PRESSURE: 122 MMHG | OXYGEN SATURATION: 96 % | WEIGHT: 182 LBS | DIASTOLIC BLOOD PRESSURE: 68 MMHG | HEIGHT: 73 IN | HEART RATE: 60 BPM | BODY MASS INDEX: 24.12 KG/M2

## 2021-12-29 DIAGNOSIS — I49.5 SICK SINUS SYNDROME: ICD-10-CM

## 2021-12-29 DIAGNOSIS — E78.2 MIXED HYPERLIPIDEMIA: ICD-10-CM

## 2021-12-29 DIAGNOSIS — I47.2 VENTRICULAR TACHYCARDIA: ICD-10-CM

## 2021-12-29 DIAGNOSIS — Z79.01 LONG TERM (CURRENT) USE OF ANTICOAGULANTS: ICD-10-CM

## 2021-12-29 DIAGNOSIS — I10 ESSENTIAL (PRIMARY) HYPERTENSION: ICD-10-CM

## 2021-12-29 DIAGNOSIS — I48.0 PAROXYSMAL ATRIAL FIBRILLATION: ICD-10-CM

## 2021-12-29 DIAGNOSIS — I65.23 OCCLUSION AND STENOSIS OF BILATERAL CAROTID ARTERIES: ICD-10-CM

## 2021-12-29 DIAGNOSIS — I42.8 OTHER CARDIOMYOPATHIES: ICD-10-CM

## 2021-12-29 PROCEDURE — 99215 OFFICE O/P EST HI 40 MIN: CPT

## 2021-12-29 PROCEDURE — 93280 PM DEVICE PROGR EVAL DUAL: CPT

## 2021-12-29 RX ORDER — ZOLPIDEM TARTRATE 5 MG/1
5 TABLET ORAL
Refills: 0 | Status: ACTIVE | COMMUNITY

## 2021-12-29 RX ORDER — ZOLPIDEM TARTRATE 10 MG/1
10 TABLET ORAL
Refills: 0 | Status: DISCONTINUED | COMMUNITY
End: 2021-12-29

## 2021-12-29 RX ORDER — GABAPENTIN 100 MG/1
100 CAPSULE ORAL
Qty: 90 | Refills: 0 | Status: DISCONTINUED | COMMUNITY
Start: 2018-07-16 | End: 2021-12-29

## 2021-12-29 RX ORDER — ATENOLOL 25 MG/1
25 TABLET ORAL DAILY
Qty: 135 | Refills: 3 | Status: ACTIVE | COMMUNITY
Start: 1900-01-01 | End: 1900-01-01

## 2021-12-29 NOTE — HISTORY OF PRESENT ILLNESS
[FreeTextEntry1] : His other active medical problems as noted below\par \par • History of atrial fibrillation/flutter, status post ablation,  no recurrent episodes.  off amiodarone. on long-term anticoagulation with Eliquis at present. no bleeding complications.\par \par * permanent pacemaker. normally functioning.  Sick sinus syndrome.  Medtronic.  Dual-chamber.\par \par •Essential hypertension, stable on atenolol.No CHF.  No CK D.  Nonsmoker.\par \par •Vitamin D deficiency and renal stone disease, being managed through your office. No recurrent symptoms.\par \par *NSVT.  No syncope.  LVEF around 45 to 50%.  On atenolol.\par \par *Presumptive diagnosis of ischemic cardiomyopathy with mildly reduced LV systolic function.  Class II functional status.  No history of congestive heart failure.\par

## 2021-12-29 NOTE — PHYSICAL EXAM
[Well Developed] : well developed [Well Nourished] : well nourished [No Acute Distress] : no acute distress [Normal Conjunctiva] : normal conjunctiva [Normal Venous Pressure] : normal venous pressure [Normal S1, S2] : normal S1, S2 [No Rub] : no rub [No Gallop] : no gallop [Clear Lung Fields] : clear lung fields [Good Air Entry] : good air entry [No Respiratory Distress] : no respiratory distress  [Soft] : abdomen soft [No Edema] : no edema [No Cyanosis] : no cyanosis [No Clubbing] : no clubbing [No Varicosities] : no varicosities [Normal Radial B/L] : normal radial B/L [Normal PT B/L] : normal PT B/L [Normal DP B/L] : normal DP B/L [No Rash] : no rash [Moves all extremities] : moves all extremities [No Focal Deficits] : no focal deficits [Normal Speech] : normal speech [Alert and Oriented] : alert and oriented [Normal memory] : normal memory [de-identified] : Decreased carotid upstroke. [de-identified] : Midsystolic murmur. [de-identified] : No bruit [de-identified] : Arthritic gait

## 2021-12-29 NOTE — CARDIOLOGY SUMMARY
[No Ischemia] : no Ischemia [No Symptoms] : no Symptoms [Fixed Defect] : fixed defect [___] : [unfilled] [LVEF ___%] : LVEF [unfilled]% [None] : no pulmonary hypertension [Normal] : normal LA size [Mild] : mild mitral regurgitation [de-identified] : Frameri.  Dual-chamber.

## 2021-12-29 NOTE — PROCEDURE
[Pacemaker] : pacemaker [DDD] : DDD [Voltage: ___ volts] : Voltage was [unfilled] volts [Threshold Testing Performed] : Threshold testing was performed [Lead Imp:  ___ohms] : lead impedance was [unfilled] ohms [Sensing Amplitude ___mv] : sensing amplitude was [unfilled] mv [___V @] : [unfilled] V [___ ms] : [unfilled] ms [Outputs/Safety Margin] : output changed to allow for adequate safety margin [Counters Reset] : the counters were reset [Apace-Vpace ___ %] : Apace-Vpace [unfilled]% [de-identified] : Medtronic [de-identified] : Radha LINDSEY DR MRI W3DR01 [de-identified] : [id number] [de-identified] : [birth year] [de-identified] :  [de-identified] : settings\par \par atrium \par sensing 0.30mv\par amplitude 2.25v (auto)\par duration 0.40ms (auto)\par \par ventricle\par sensing 1.20mv \par amplitude 4.5v (auto)\par duration 0.40ms (auto)\par \par Patient has home monitor which he has not activated. Reviewed role of home monitoring and at this time he does not want to activate monitor\par \par 1 episode of reported NSVT on May 13, 2021.  12 seconds at 194 BPM.  Asymptomatic. \par On atenolol.  Seeing Dr. Galvez today.

## 2021-12-29 NOTE — DISCUSSION/SUMMARY
[FreeTextEntry1] : Assessment and suggestions.\par \par #1  Presumptive diagnosis of ischemic cardiomyopathy with mildly reduced LV ejection fraction.  Abnormal finding on echocardiogram and nuclear myocardial perfusion scan in presence of small area of scarring mainly involving apex without significant symptoms, overall mildly reduced to preserved LV systolic function, no signs of congestive heart failure.  Last evaluation March 2020.\par Asymptomatic.  Continue aggressive management for lifestyle and risk factor modifications.  No change in clinical status.  If any change in clinical status he will contact us then appropriate further evaluation would include invasive coronary angiography guided therapy.\par Echocardiogram will be repeated again.  If any further change in LV ejection fraction dimension optimization of medical therapy will be done.\par Has been reviewed with him.\par #2NSVT.  Rapid rate.  Asymptomatic.  Repeat echocardiogram.  Increase atenolol to 37.5 mg daily and maximize as tolerated.  No syncope.   Management of presumptive ischemic cardiomyopathy as noted above.\par #3 paroxysmal atrial fibrillation/flutter/sick sinus syndrome. Status post permanent pacemaker implantation and ablation. On long-term anticoagulation.  High risk medication use.  Follow labs regularly.\par Permanent pacemaker evaluation in the office.\par Watch for bleeding.\par #4 hyperlipidemia. On atorvastatin. Tolerating it well.  stable.  Continue low-dose carbohydrate diet.\par #5 essential hypertension. No congestive heart failure. No history of renal insufficiency. Follow labs. Continue present medications.\par #6 preventive care with your office.\par Counseling regarding low saturated fat, salt and carbohydrate intake was reviewed. Active lifestyle and regular. Exercise along with weight management is advised.\par #6.  Carotid atherosclerosis.  Multiple risk factors.  Carotid Doppler study and abdominal aortic ultrasound.\par \par All the above were at length reviewed. Answered all the questions. Thank you very much for this kind referral. Please do not hesitate to give me a call for any question.\par Part of this transcription was done with voice recognition software and phonetically similar errors are common. I apologize for that. Please donot hesitate to call for any questions due to above.\par \par f/u 6 months\par

## 2021-12-29 NOTE — REASON FOR VISIT
[Symptom and Test Evaluation] : symptom and test evaluation [Hyperlipidemia] : hyperlipidemia [Hypertension] : hypertension [FreeTextEntry3] : Dr. Turner [FreeTextEntry1] : 76-year-old gentleman comes in for followup consultation to review labs.  Pacemaker evaluation.\par Activity limited because of hip and knee pain.\par No chest pain. No PND, orthopnea.\par No pedal edema.\par No palpitation, dizziness, or syncopal episode.\par Stable. Weight.\par Stable. Diet.\par No bleeding complications from his anticoagulation\par No recent hospital admission.\par

## 2021-12-29 NOTE — ASSESSMENT
[FreeTextEntry1] : past tests for reference:\par carotid doppler study 2009. mild atherosclerosis\par Nuclear stress test 10/6/2015 performed with [employer].  Nondiagnostic for ischemia by EKG due to AV pacing at rest.  Normal left ventricular ejection fraction.  Wall motion demonstrated mild hypokinesis in the inferior region and apical region.  No significant change when noted compared to the report on January 24, 2014.\par Echocardiogram 10/6/2015 be a 60%, normal left atrial and ventricular size, normal right atrial and ventricular size, trace mitral regurgitation, no aortic insufficiency, trace pulmonic insufficiency, trace tricuspid regurgitation.  No significant changes from prior study.\par EKG ordered and interpreted by me on January 19, 2017. Indication presence of permanent pacemaker. Paroxysmal atrial fibrillation. Into position. AV dual paced rhythm.\par Echocardiogram. Done on January 7, 2017. LV ejection fraction was 60%\par \par Reviewed on April 10, 2019\par Labs from January 2, 2019 was reviewed. Elevated triglycerides were noted. Otherwise, stable, CMP, and LDL cholesterol.\par Echocardiogram. February 12, 2019. LV ejection fraction low normal to 50%. LVIDD 4.0 cm. There is mild mitral regurgitation. Normal right ventricular size and function. No pericardial effusion. Normal pulmonary pressures.\par \par Reviewed on March 10, 2020\par Labs from January 2020, echocardiogram March 4, 2020 and nuclear myocardial perfusion scan March 4, 2020 were reviewed\par EKG which was reviewed today shows AV sequential pacing.  The reading on the EKG is wrong.  It should be read as AV sequential pacing.\par \par Reviewed on October 8, 2020\par Labs from July 20, 2020 were reviewed\par Pacemaker evaluation today normal functioning noted\par \par Reviewed on December 29, 2021.\par Labs December 8, 2021.  CBC was stable sodium 142 potassium 4.2 creatinine 0.6 LFT normal LDL 72 HDL 67 triglycerides 117\par Pacemaker evaluation.  NSVT.  Maximum rate 194.  12 seconds.  Asymptomatic.\par

## 2022-01-05 ENCOUNTER — RX RENEWAL (OUTPATIENT)
Age: 77
End: 2022-01-05

## 2022-01-20 ENCOUNTER — APPOINTMENT (OUTPATIENT)
Dept: CARDIOLOGY | Facility: CLINIC | Age: 77
End: 2022-01-20
Payer: MEDICARE

## 2022-01-20 PROCEDURE — 93880 EXTRACRANIAL BILAT STUDY: CPT

## 2022-01-20 PROCEDURE — 93306 TTE W/DOPPLER COMPLETE: CPT

## 2022-01-20 PROCEDURE — 93979 VASCULAR STUDY: CPT

## 2022-01-25 DIAGNOSIS — I77.810 THORACIC AORTIC ECTASIA: ICD-10-CM

## 2022-01-25 DIAGNOSIS — I77.811 ABDOMINAL AORTIC ECTASIA: ICD-10-CM

## 2022-03-08 ENCOUNTER — APPOINTMENT (OUTPATIENT)
Dept: CARDIOLOGY | Facility: CLINIC | Age: 77
End: 2022-03-08

## 2022-06-14 ENCOUNTER — APPOINTMENT (OUTPATIENT)
Dept: CARDIOLOGY | Facility: CLINIC | Age: 77
End: 2022-06-14

## 2023-02-27 RX ORDER — APIXABAN 5 MG/1
5 TABLET, FILM COATED ORAL
Qty: 180 | Refills: 0 | Status: ACTIVE | COMMUNITY
Start: 1900-01-01 | End: 1900-01-01

## 2023-09-06 RX ORDER — ATORVASTATIN CALCIUM 40 MG/1
40 TABLET, FILM COATED ORAL
Qty: 30 | Refills: 0 | Status: ACTIVE | COMMUNITY
Start: 1900-01-01 | End: 1900-01-01

## 2023-11-09 RX ORDER — VALSARTAN 40 MG/1
40 TABLET, COATED ORAL TWICE DAILY
Qty: 30 | Refills: 0 | Status: ACTIVE | COMMUNITY
Start: 2020-03-10 | End: 1900-01-01

## 2024-08-06 ENCOUNTER — APPOINTMENT (OUTPATIENT)
Dept: CARDIOLOGY | Facility: CLINIC | Age: 79
End: 2024-08-06

## 2024-08-06 ENCOUNTER — NON-APPOINTMENT (OUTPATIENT)
Age: 79
End: 2024-08-06

## 2024-08-06 PROCEDURE — 93000 ELECTROCARDIOGRAM COMPLETE: CPT | Mod: XU

## 2024-08-06 PROCEDURE — 99214 OFFICE O/P EST MOD 30 MIN: CPT

## 2024-08-06 PROCEDURE — 93280 PM DEVICE PROGR EVAL DUAL: CPT

## 2024-08-06 NOTE — PROCEDURE
[Pacemaker] : pacemaker [DDD] : DDD [Threshold Testing Performed] : Threshold testing was performed [Lead Imp:  ___ohms] : lead impedance was [unfilled] ohms [Sensing Amplitude ___mv] : sensing amplitude was [unfilled] mv [___V @] : [unfilled] V [___ ms] : [unfilled] ms [Counters Reset] : the counters were reset [Apace-Vpace ___ %] : Apace-Vpace [unfilled]% [None] : none [de-identified] : Medtronic [de-identified] : Radha LINDSEY DR MRI W3DR01 [de-identified] : [id number] [de-identified] : 7-30-19 [de-identified] :  [de-identified] : 1.5 years  [de-identified] :  settings atrium  sensing 0.30mv amplitude 2.25v (auto) duration 0.40ms (auto)  ventricle sensing 1.20mv  amplitude 4.75v (auto) duration 0.40ms (auto)  Brief / rare episodes of NSVT 1-3 sec known history. Asx. On atenolol 37.5mg daily.  Updating echo for LVEF. See office note for full details.  Otherwise normal device function.  Emphasized regular device followup.  Will reinstate home monitoring, sending new monitor and will have remote team followup for initial download. Next in 3 months remote. In office in 6 mo.      Sincerely,  TAE Ruiz Patients history, testing, and plan reviewed with supervising MD: Dr. Des Galvez

## 2024-08-06 NOTE — REASON FOR VISIT
[Symptom and Test Evaluation] : symptom and test evaluation [Hyperlipidemia] : hyperlipidemia [Hypertension] : hypertension [FreeTextEntry3] : Dr. Turner [FreeTextEntry1] : 79-year-old gentleman comes in for preop cardiac clearance prior to basal cell removal.  Activity limited because of hip and knee pain. No chest pain. No PND, orthopnea. No pedal edema. No palpitation, dizziness, or syncopal episode. Stable. Weight. Stable. Diet. No bleeding complications from his anticoagulation No recent hospital admission. Last seen in 2021!!!

## 2024-08-06 NOTE — DISCUSSION/SUMMARY
[FreeTextEntry1] : ALEKSEY MOHR is a 79 year old M who presents today Aug 06, 2024 with the above history and the following active issues:   #1  Presumptive diagnosis of ischemic cardiomyopathy with mildly reduced LV ejection fraction.  Abnormal finding on echocardiogram and nuclear myocardial perfusion scan in presence of small area of scarring mainly involving apex without significant symptoms, overall mildly reduced to preserved LV systolic function, no signs of congestive heart failure.  Last ischemic evaluation March 2020. Asymptomatic.  Continue aggressive management for lifestyle and risk factor modifications.  No change in clinical status.  Echocardiogram will be repeated again.  If any further change in LV ejection fraction dimension optimization of medical therapy will be done. #2NSVT.  Rapid rate.  Asymptomatic.  Repeat echocardiogram.  Cont 37.5 mg daily and maximize as tolerated.  No syncope.   Management of presumptive ischemic cardiomyopathy as noted above. #3 paroxysmal atrial fibrillation/flutter/sick sinus syndrome. Status post permanent pacemaker implantation and ablation. On long-term anticoagulation.  High risk medication use.  Follow labs regularly. Permanent pacemaker evaluation in the office. Watch for bleeding. #4 hyperlipidemia. On atorvastatin. Tolerating it well.  stable.  Continue low-dose carbohydrate diet. #5 essential hypertension. No congestive heart failure. No history of renal insufficiency. Follow labs. Continue present medications. #6 preventive care with your office. Counseling regarding low saturated fat, salt and carbohydrate intake was reviewed. Active lifestyle and regular. Exercise along with weight management is advised. #6.  Carotid atherosclerosis.  Multiple risk factors.  Cont statin. #7 Small AAA 3cm, repeat requested for surveillance.  #8 Patient optimized from a cardiac standpoint to proceed with basal cell removal tomorrow 8/7/24. May hold eliquis 48h prior and restart as soon as hemodynamically stable.   Clinical followup planned after echo, abd US, and labs.  Emphasized importance of routine clinical followup + DVC followup to avoid complications.  Any questions and concerns were addressed and resolved.  Sincerely,  TAE Ruiz Patients history, testing, and plan reviewed with supervising MD: Dr. Des Galvez

## 2024-08-06 NOTE — PROCEDURE
[Pacemaker] : pacemaker [DDD] : DDD [Threshold Testing Performed] : Threshold testing was performed [Lead Imp:  ___ohms] : lead impedance was [unfilled] ohms [Sensing Amplitude ___mv] : sensing amplitude was [unfilled] mv [___V @] : [unfilled] V [___ ms] : [unfilled] ms [Counters Reset] : the counters were reset [Apace-Vpace ___ %] : Apace-Vpace [unfilled]% [None] : none [de-identified] : Medtronic [de-identified] : Radha LINDSEY DR MRI [id number] [de-identified] : [id number] [de-identified] : [birth year] [de-identified] :  [de-identified] : 1.5 years  [de-identified] :  settings atrium  sensing 0.30mv amplitude 2.25v (auto) duration 0.40ms (auto)  ventricle sensing 1.20mv  amplitude 4.75v (auto) duration 0.40ms (auto)  Brief / rare episodes of NSVT 1-3 sec known history. Asx. On atenolol 37.5mg daily.  Updating echo for LVEF. See office note for full details.  Otherwise normal device function.  Emphasized regular device followup.  Will reinstate home monitoring, sending new monitor and will have remote team followup for initial download. Next in 3 months remote. In office in 6 mo.      Sincerely,  TAE Ruiz Patients history, testing, and plan reviewed with supervising MD: Dr. Des Galvez

## 2024-08-06 NOTE — ASSESSMENT
[FreeTextEntry1] : past tests for reference: carotid doppler study 2009. mild atherosclerosis Nuclear stress test 10/6/2015 performed with [employer].  Nondiagnostic for ischemia by EKG due to AV pacing at rest.  Normal left ventricular ejection fraction.  Wall motion demonstrated mild hypokinesis in the inferior region and apical region.  No significant change when noted compared to the report on January 24, 2014. Echocardiogram 10/6/2015 be a 60%, normal left atrial and ventricular size, normal right atrial and ventricular size, trace mitral regurgitation, no aortic insufficiency, trace pulmonic insufficiency, trace tricuspid regurgitation.  No significant changes from prior study. EKG ordered and interpreted by me on January 19, 2017. Indication presence of permanent pacemaker. Paroxysmal atrial fibrillation. Into position. AV dual paced rhythm. Echocardiogram. Done on January 7, 2017. LV ejection fraction was 60%  Reviewed on April 10, 2019 Labs from January 2, 2019 was reviewed. Elevated triglycerides were noted. Otherwise, stable, CMP, and LDL cholesterol. Echocardiogram. February 12, 2019. LV ejection fraction low normal to 50%. LVIDD 4.0 cm. There is mild mitral regurgitation. Normal right ventricular size and function. No pericardial effusion. Normal pulmonary pressures.  Reviewed on March 10, 2020 Labs from January 2020, echocardiogram March 4, 2020 and nuclear myocardial perfusion scan March 4, 2020 were reviewed EKG which was reviewed today shows AV sequential pacing.  The reading on the EKG is wrong.  It should be read as AV sequential pacing.  Reviewed on October 8, 2020 Labs from July 20, 2020 were reviewed Pacemaker evaluation today normal functioning noted  Reviewed on December 29, 2021. Labs December 8, 2021.  CBC was stable sodium 142 potassium 4.2 creatinine 0.6 LFT normal LDL 72 HDL 67 triglycerides 117 Pacemaker evaluation.  NSVT.  Maximum rate 194.  12 seconds.  Asymptomatic.  Echo 1/2022 EF 45% aortic root 4.5cm, mild VHD Carotid doppler mild nonobx atherosclerosis Abd US 3cm AAA  8/2024 Device check 1-3 sec asx NSVT. Otherwise normal function. 1.5years battery.  8/6/24 EKG VPACED

## 2024-08-06 NOTE — CARDIOLOGY SUMMARY
[No Ischemia] : no Ischemia [No Symptoms] : no Symptoms [Fixed Defect] : fixed defect [___] : [unfilled] [LVEF ___%] : LVEF [unfilled]% [None] : no pulmonary hypertension [Normal] : normal LA size [Mild] : mild mitral regurgitation [de-identified] : [employer].  Dual-chamber.

## 2024-08-06 NOTE — CARDIOLOGY SUMMARY
[No Ischemia] : no Ischemia [No Symptoms] : no Symptoms [Fixed Defect] : fixed defect [___] : [unfilled] [LVEF ___%] : LVEF [unfilled]% [None] : no pulmonary hypertension [Normal] : normal LA size [Mild] : mild mitral regurgitation [de-identified] : [employer].  Dual-chamber.

## 2024-08-06 NOTE — HISTORY OF PRESENT ILLNESS
[FreeTextEntry1] : His other active medical problems as noted below  - History of atrial fibrillation/flutter, status post ablation,  no recurrent episodes.  off amiodarone. on long-term anticoagulation with Eliquis at present. no bleeding complications.  - permanent pacemaker. normally functioning.  Sick sinus syndrome.  Medtronic.  Dual-chamber.  -Essential hypertension, stable on atenolol. No CHF.  No CK D.  Nonsmoker.  -Vitamin D deficiency and renal stone disease, being managed through your office. No recurrent symptoms.  *NSVT.  No syncope.  LVEF around 45 to 50%.  On atenolol.  *Presumptive diagnosis of ischemic cardiomyopathy with mildly reduced LV systolic function.  Class II functional status.  No history of congestive heart failure.

## 2024-08-06 NOTE — ASSESSMENT
[FreeTextEntry1] : past tests for reference: carotid doppler study 2009. mild atherosclerosis Nuclear stress test 10/6/2015 performed with [employer].  Nondiagnostic for ischemia by EKG due to AV pacing at rest.  Normal left ventricular ejection fraction.  Wall motion demonstrated mild hypokinesis in the inferior region and apical region.  No significant change when noted compared to the report on January 24, 2014. Echocardiogram 10/6/2015 be a 60%, normal left atrial and ventricular size, normal right atrial and ventricular size, trace mitral regurgitation, no aortic insufficiency, trace pulmonic insufficiency, trace tricuspid regurgitation.  No significant changes from prior study. EKG ordered and interpreted by me on January 19, 2017. Indication presence of permanent pacemaker. Paroxysmal atrial fibrillation. Into position. AV dual paced rhythm. Echocardiogram. Done on January 7, 2017. LV ejection fraction was 60%  Reviewed on April 10, 2019 Labs from January 2, 2019 was reviewed. Elevated triglycerides were noted. Otherwise, stable, CMP, and LDL cholesterol. Echocardiogram. February 12, 2019. LV ejection fraction low normal to 50%. LVIDD 4.0 cm. There is mild mitral regurgitation. Normal right ventricular size and function. No pericardial effusion. Normal pulmonary pressures.  Reviewed on March 10, 2020 Labs from January 2020, echocardiogram March 4, 2020 and nuclear myocardial perfusion scan March 4, 2020 were reviewed EKG which was reviewed today shows AV sequential pacing.  The reading on the EKG is wrong.  It should be read as AV sequential pacing.  Reviewed on October 8, 2020 Labs from July 20, 2020 were reviewed Pacemaker evaluation today normal functioning noted  Reviewed on December 29, 2021. Labs December 8, 2021.  CBC was stable sodium 142 potassium 4.2 creatinine 0.6 LFT normal LDL 72 HDL 67 triglycerides 117 Pacemaker evaluation.  NSVT.  Maximum rate 194.  12 seconds.  Asymptomatic.  Echo 1/2022 EF 45% aortic root 4.5cm, mild VHD Carotid doppler mild nonobx atherosclerosis Abd US 3cm AAA  8/2024 Device check 1-3 sec asx NSVT. Otherwise normal function. 1.5years battery.  8/6/24 EKG VPACED

## 2024-09-03 ENCOUNTER — APPOINTMENT (OUTPATIENT)
Dept: CARDIOLOGY | Facility: CLINIC | Age: 79
End: 2024-09-03
Payer: MEDICARE

## 2024-09-03 VITALS — SYSTOLIC BLOOD PRESSURE: 126 MMHG | DIASTOLIC BLOOD PRESSURE: 74 MMHG

## 2024-09-03 VITALS
RESPIRATION RATE: 14 BRPM | BODY MASS INDEX: 24.39 KG/M2 | OXYGEN SATURATION: 96 % | HEART RATE: 60 BPM | WEIGHT: 184 LBS | HEIGHT: 73 IN | DIASTOLIC BLOOD PRESSURE: 72 MMHG | SYSTOLIC BLOOD PRESSURE: 124 MMHG

## 2024-09-03 DIAGNOSIS — I48.0 PAROXYSMAL ATRIAL FIBRILLATION: ICD-10-CM

## 2024-09-03 DIAGNOSIS — I10 ESSENTIAL (PRIMARY) HYPERTENSION: ICD-10-CM

## 2024-09-03 DIAGNOSIS — Z95.0 PRESENCE OF CARDIAC PACEMAKER: ICD-10-CM

## 2024-09-03 DIAGNOSIS — I48.92 UNSPECIFIED ATRIAL FLUTTER: ICD-10-CM

## 2024-09-03 DIAGNOSIS — R42 DIZZINESS AND GIDDINESS: ICD-10-CM

## 2024-09-03 DIAGNOSIS — E78.2 MIXED HYPERLIPIDEMIA: ICD-10-CM

## 2024-09-03 DIAGNOSIS — I47.20 VENTRICULAR TACHYCARDIA, UNSPECIFIED: ICD-10-CM

## 2024-09-03 DIAGNOSIS — I77.810 THORACIC AORTIC ECTASIA: ICD-10-CM

## 2024-09-03 PROCEDURE — G2211 COMPLEX E/M VISIT ADD ON: CPT

## 2024-09-03 PROCEDURE — 99214 OFFICE O/P EST MOD 30 MIN: CPT

## 2024-09-03 PROCEDURE — 93306 TTE W/DOPPLER COMPLETE: CPT

## 2024-09-03 PROCEDURE — 93280 PM DEVICE PROGR EVAL DUAL: CPT

## 2024-09-03 PROCEDURE — 93978 VASCULAR STUDY: CPT

## 2024-09-03 NOTE — PHYSICAL EXAM
[Well Developed] : well developed [Normal Conjunctiva] : normal conjunctiva [Normal Venous Pressure] : normal venous pressure [No Carotid Bruit] : no carotid bruit [Normal S1, S2] : normal S1, S2 [Murmur] : murmur [Clear Lung Fields] : clear lung fields [Abnormal Gait] : abnormal gait [Normal Speech] : normal speech [Alert and Oriented] : alert and oriented

## 2024-09-03 NOTE — PROCEDURE
[Pacemaker] : pacemaker [DDD] : DDD [Threshold Testing Performed] : Threshold testing was performed [Lead Imp:  ___ohms] : lead impedance was [unfilled] ohms [Sensing Amplitude ___mv] : sensing amplitude was [unfilled] mv [___V @] : [unfilled] V [___ ms] : [unfilled] ms [None] : none [Counters Reset] : the counters were reset [de-identified] : Medtronic [de-identified] : Radha LINDSEY DR MRI [id number] [de-identified] : [id number] [de-identified] : [birth year] [de-identified] :  [de-identified] : 1.6 years  [de-identified] : AP: 76.2% : 90.3%  Device interrogated today as remote monitoring team received notification regarding ? lead fracture or loose set screw along with 670 short V-V intervals.  Pt notes intermittent brief dizziness.  Lasting a second or two.  No syncope or near syncope.   RV lead thresholds are high, but stable.  Impedances stable.   No arrhythmia noted. Discussed with [employer] rep.    Recommend pt follow up with EP for further evaluation.  Red flag symptoms that would warrant emergent evaluation discussed.  Pt verbalizes understanding.

## 2024-09-04 ENCOUNTER — APPOINTMENT (OUTPATIENT)
Dept: CARDIOLOGY | Facility: CLINIC | Age: 79
End: 2024-09-04

## 2024-09-09 NOTE — DISCUSSION/SUMMARY
[FreeTextEntry1] : ALEKSEY MOHR is a 79 year old M who presents today with the above history and the following active issues:   #1  Presumptive diagnosis of ischemic cardiomyopathy with mildly reduced LV ejection fraction.  Abnormal finding on echocardiogram and nuclear myocardial perfusion scan in presence of small area of scarring mainly involving apex without significant symptoms, now with improved and stable LV systolic function. Asymptomatic.  Continue aggressive management for lifestyle and risk factor modifications.  No change in clinical status.  Preserved LV systolic function. #2NSVT.  Rapid rate. preserved EF.  No signs of unstable CAD.  No relationship with dizziness. #3 paroxysmal atrial fibrillation/flutter/sick sinus syndrome. Status post permanent pacemaker implantation and ablation. On long-term anticoagulation.  High risk medication use.  Follow labs regularly. Permanent pacemaker evaluation in the office. Watch for bleeding. #4 hyperlipidemia. On atorvastatin. Tolerating it well.  stable.  Continue low-dose carbohydrate diet. #5 essential hypertension. No congestive heart failure. No history of renal insufficiency. Follow labs. Considering dizziness.  Lower blood pressure.  In spite of not having orthostatic shifts recommended to stop morning dose of valsartan and see whether that makes a difference.  He has appropriate hydration. #6 preventive care with your office. Counseling regarding low saturated fat, salt and carbohydrate intake was reviewed. Active lifestyle and regular. Exercise along with weight management is advised. #6.  Carotid atherosclerosis.  Multiple risk factors.  Cont statin. #7 Small AAA 3cm, possible mild increase.At present 3.2 cm.  Mild change.  Continue to follow.  Unless there is sudden change or increase dimensions of 5.5 cm no clinical indication for intervention.  Manage blood pressure and cholesterol. .  Emphasized importance of routine clinical followup + DVC followup to avoid complications.  Any questions and concerns were addressed and resolved. Counseling regarding low saturated fat,salt and carbohydrate intake was reviewed. Active lifestyle and regular exercise  along with weight management is advised.   Sincerely,  Des Galvez MD, FACC, TOR

## 2024-09-09 NOTE — ADDENDUM
[FreeTextEntry1] : I am asked to do addendum for preoperative cardiac assessment prior to Mohs procedure. At present, there are no active cardiac conditions. No recent unstable coronary syndrome, decompensated heart failure, severe valvular heart disease or significant dysrhythmias. The clinical benefit of the proposed procedure outweighs the associated cardiovascular risk. Risk not attenuated with further cardiovascular testing. Prior testing as outlined above. Optimized from a cardiovascular perspective. Control blood pressure, heart rate, pulse oximetry perioperatively. If required appropriate intravenous medication for the outpatient oral medication for blood pressure, heart rate control. DVT prophylaxis as per indication. For surgery and invasive procedures, recommend to discontinue apixaban at least 48 hours prior to elective surgery or invasive procedures with a moderate-to-high risk of clinically significant bleeding. Anticoagulation bridging during the 48 hours apixaban is interrupted and prior to the intervention is not generally required. Reinitiate apixaban when adequate hemostasis has been achieved.  If oral therapy cannot be administered, then consider administration of a parenteral anticoagulant. Note excess discontinuation of any oral anticoagulant, including apixaban, increases the risk of thrombotic events. Patient was counseled and verbalizes understanding of these associated risks  please guillermo for questions.  Sincerely,  Des Galvez MD, FACC, TOR

## 2024-09-09 NOTE — ASSESSMENT
[FreeTextEntry1] : past tests for reference: carotid doppler study 2009. mild atherosclerosis Nuclear stress test 10/6/2015 performed with [employer].  Nondiagnostic for ischemia by EKG due to AV pacing at rest.  Normal left ventricular ejection fraction.  Wall motion demonstrated mild hypokinesis in the inferior region and apical region.  No significant change when noted compared to the report on January 24, 2014. Echocardiogram 10/6/2015 be a 60%, normal left atrial and ventricular size, normal right atrial and ventricular size, trace mitral regurgitation, no aortic insufficiency, trace pulmonic insufficiency, trace tricuspid regurgitation.  No significant changes from prior study. EKG ordered and interpreted by me on January 19, 2017. Indication presence of permanent pacemaker. Paroxysmal atrial fibrillation. Into position. AV dual paced rhythm. Echocardiogram. Done on January 7, 2017. LV ejection fraction was 60%  Reviewed on April 10, 2019 Labs from January 2, 2019 was reviewed. Elevated triglycerides were noted. Otherwise, stable, CMP, and LDL cholesterol. Echocardiogram. February 12, 2019. LV ejection fraction low normal to 50%. LVIDD 4.0 cm. There is mild mitral regurgitation. Normal right ventricular size and function. No pericardial effusion. Normal pulmonary pressures.  Reviewed on March 10, 2020 Labs from January 2020, echocardiogram March 4, 2020 and nuclear myocardial perfusion scan March 4, 2020 were reviewed EKG which was reviewed today shows AV sequential pacing.  The reading on the EKG is wrong.  It should be read as AV sequential pacing.  Reviewed on October 8, 2020 Labs from July 20, 2020 were reviewed Pacemaker evaluation today normal functioning noted  Reviewed on December 29, 2021. Labs December 8, 2021.  CBC was stable sodium 142 potassium 4.2 creatinine 0.6 LFT normal LDL 72 HDL 67 triglycerides 117 Pacemaker evaluation.  NSVT.  Maximum rate 194.  12 seconds.  Asymptomatic.  Echo 1/2022 EF 45% aortic root 4.5cm, mild VHD Carotid doppler mild nonobx atherosclerosis Abd US 3cm AAA  8/2024 Device check 1-3 sec asx NSVT. Otherwise normal function. 1.5years battery.  8/6/24 EKG VPACED   Reviewed on September 3, 2024. Echocardiogram abdominal ultrasound and recent labs were reviewed.  Last EKG was reviewed.  Device interrogation done today was reviewed.

## 2024-09-09 NOTE — ASSESSMENT
[FreeTextEntry1] : past tests for reference: carotid doppler study 2009. mild atherosclerosis Nuclear stress test 10/6/2015 performed with [employer].  Nondiagnostic for ischemia by EKG due to AV pacing at rest.  Normal left ventricular ejection fraction.  Wall motion demonstrated mild hypokinesis in the inferior region and apical region.  No significant change when noted compared to the report on January 24, 2014. Echocardiogram 10/6/2015 be a 60%, normal left atrial and ventricular size, normal right atrial and ventricular size, trace mitral regurgitation, no aortic insufficiency, trace pulmonic insufficiency, trace tricuspid regurgitation.  No significant changes from prior study. EKG ordered and interpreted by me on January 19, 2017. Indication presence of permanent pacemaker. Paroxysmal atrial fibrillation. Into position. AV dual paced rhythm. Echocardiogram. Done on January 7, 2017. LV ejection fraction was 60%  Reviewed on April 10, 2019 Labs from January 2, 2019 was reviewed. Elevated triglycerides were noted. Otherwise, stable, CMP, and LDL cholesterol. Echocardiogram. February 12, 2019. LV ejection fraction low normal to 50%. LVIDD 4.0 cm. There is mild mitral regurgitation. Normal right ventricular size and function. No pericardial effusion. Normal pulmonary pressures.  Reviewed on March 10, 2020 Labs from January 2020, echocardiogram March 4, 2020 and nuclear myocardial perfusion scan March 4, 2020 were reviewed EKG which was reviewed today shows AV sequential pacing.  The reading on the EKG is wrong.  It should be read as AV sequential pacing.  Reviewed on October 8, 2020 Labs from July 20, 2020 were reviewed Pacemaker evaluation today normal functioning noted  Reviewed on December 29, 2021. Labs December 8, 2021.  CBC was stable sodium 142 potassium 4.2 creatinine 0.6 LFT normal LDL 72 HDL 67 triglycerides 117 Pacemaker evaluation.  NSVT.  Maximum rate 194.  12 seconds.  Asymptomatic.  Echo 1/2022 EF 45% aortic root 4.5cm, mild VHD Carotid doppler mild nonobx atherosclerosis Abd US 3cm AAA  8/2024 Device check 1-3 sec asx NSVT. Otherwise normal function. 1.5years battery.  8/6/24 EKG VPACED   Reviewed on September 3, 2024. Echocardiogram abdominal ultrasound and recent labs were reviewed.  Last EKG was reviewed.  Device interrogation done today was reviewed.

## 2024-09-09 NOTE — CARDIOLOGY SUMMARY
[de-identified] : EKG.  August 2024.  Ventricular paced rhythm. [de-identified] : September 3, 2024.  LV ejection fraction 55 to 60%.  Aortic root 3.9 cm trace MR.  Mild TR. [de-identified] : Medtronic.  Dual-chamber.  Interrogation was done again today on September 3, 2024 secondary to sudden alarms/notifications regarding lead.  [de-identified] : Abdominal aortic ultrasound September 3, 2024.  Maximum dimension around 3.2 cm.

## 2024-09-09 NOTE — CARDIOLOGY SUMMARY
[de-identified] : EKG.  August 2024.  Ventricular paced rhythm. [de-identified] : September 3, 2024.  LV ejection fraction 55 to 60%.  Aortic root 3.9 cm trace MR.  Mild TR. [de-identified] : Medtronic.  Dual-chamber.  Interrogation was done again today on September 3, 2024 secondary to sudden alarms/notifications regarding lead.  [de-identified] : Abdominal aortic ultrasound September 3, 2024.  Maximum dimension around 3.2 cm.

## 2024-09-09 NOTE — REASON FOR VISIT
[FreeTextEntry3] : Dr. Turner [FreeTextEntry1] : 79-year-old man comes in with complaint of mild intermittent dizziness.  Not postural.  No other associated cardiac symptoms.  For few months. Activity limited because of hip and knee pain. No chest pain. No PND, orthopnea. No pedal edema. No palpitation, dizziness, or syncopal episode. Stable. Weight. Stable. Diet. No bleeding complications from his anticoagulation No recent hospital admission. Last seen in 2021!!!

## 2024-09-11 ENCOUNTER — RESULT REVIEW (OUTPATIENT)
Age: 79
End: 2024-09-11

## 2024-09-17 ENCOUNTER — APPOINTMENT (OUTPATIENT)
Dept: CARDIOLOGY | Facility: CLINIC | Age: 79
End: 2024-09-17

## 2024-09-25 ENCOUNTER — APPOINTMENT (OUTPATIENT)
Dept: ELECTROPHYSIOLOGY | Facility: CLINIC | Age: 79
End: 2024-09-25
Payer: MEDICARE

## 2024-09-25 VITALS
WEIGHT: 180 LBS | SYSTOLIC BLOOD PRESSURE: 116 MMHG | DIASTOLIC BLOOD PRESSURE: 72 MMHG | OXYGEN SATURATION: 97 % | HEIGHT: 73 IN | HEART RATE: 79 BPM | BODY MASS INDEX: 23.86 KG/M2

## 2024-09-25 PROCEDURE — 99213 OFFICE O/P EST LOW 20 MIN: CPT

## 2024-09-30 NOTE — PHYSICAL EXAM
[No Acute Distress] : no acute distress [Normal S1, S2] : normal S1, S2 [Clear Lung Fields] : clear lung fields [No Edema] : no edema [de-identified] : Pacemaker wound healing well.

## 2024-09-30 NOTE — PHYSICAL EXAM
[No Acute Distress] : no acute distress [Normal S1, S2] : normal S1, S2 [Clear Lung Fields] : clear lung fields [No Edema] : no edema [de-identified] : Pacemaker wound healing well.

## 2024-09-30 NOTE — DISCUSSION/SUMMARY
[FreeTextEntry1] : Pacemaker site is healing well.  Interrogation revealed normal function of the atrial as well as RV lead.  He has no symptoms.

## 2024-09-30 NOTE — HISTORY OF PRESENT ILLNESS
[FreeTextEntry1] : Patient seen in follow-up evaluation regarding his recent pacemaker lead implant as well as replacement generator.  He had RV lead dysfunction and had symptoms with loss of capture and oversensing.  The patient underwent implantation of a new RV lead and capping of the old RV lead.  The procedure was uncomplicated.  In follow-up he is feeling well and has no complaints he has no fevers.

## 2024-09-30 NOTE — DISCUSSION/SUMMARY
[FreeTextEntry1] : Pacemaker site is healing well.  Interrogation revealed normal function of the atrial as well as RV lead.  He has no symptoms. 20:30

## 2024-10-02 ENCOUNTER — APPOINTMENT (OUTPATIENT)
Dept: CARDIOLOGY | Facility: CLINIC | Age: 79
End: 2024-10-02
Payer: MEDICARE

## 2024-10-02 VITALS
DIASTOLIC BLOOD PRESSURE: 72 MMHG | WEIGHT: 185 LBS | SYSTOLIC BLOOD PRESSURE: 112 MMHG | BODY MASS INDEX: 24.41 KG/M2 | OXYGEN SATURATION: 95 % | HEART RATE: 91 BPM

## 2024-10-02 DIAGNOSIS — I48.0 PAROXYSMAL ATRIAL FIBRILLATION: ICD-10-CM

## 2024-10-02 DIAGNOSIS — Z79.01 LONG TERM (CURRENT) USE OF ANTICOAGULANTS: ICD-10-CM

## 2024-10-02 DIAGNOSIS — I48.92 UNSPECIFIED ATRIAL FLUTTER: ICD-10-CM

## 2024-10-02 DIAGNOSIS — I49.5 SICK SINUS SYNDROME: ICD-10-CM

## 2024-10-02 DIAGNOSIS — I77.810 THORACIC AORTIC ECTASIA: ICD-10-CM

## 2024-10-02 DIAGNOSIS — I42.8 OTHER CARDIOMYOPATHIES: ICD-10-CM

## 2024-10-02 DIAGNOSIS — Z95.0 PRESENCE OF CARDIAC PACEMAKER: ICD-10-CM

## 2024-10-02 DIAGNOSIS — E78.2 MIXED HYPERLIPIDEMIA: ICD-10-CM

## 2024-10-02 PROCEDURE — 99214 OFFICE O/P EST MOD 30 MIN: CPT | Mod: 24

## 2024-10-02 PROCEDURE — G2211 COMPLEX E/M VISIT ADD ON: CPT

## 2024-10-02 PROCEDURE — 93280 PM DEVICE PROGR EVAL DUAL: CPT

## 2024-10-02 NOTE — CARDIOLOGY SUMMARY
[No Ischemia] : no Ischemia [No Symptoms] : no Symptoms [Fixed Defect] : fixed defect [___] : [unfilled] [LVEF ___%] : LVEF [unfilled]% [None] : no pulmonary hypertension [Normal] : normal LA size [Mild] : mild mitral regurgitation [de-identified] : EKG.  August 2024.  Ventricular paced rhythm. [de-identified] : September 3, 2024.  LV ejection fraction 55 to 60%.  Aortic root 3.9 cm trace MR.  Mild TR. [de-identified] : Medtronic.  October 1, 2024.  Device was interrogated.  Normal functioning.  Medtronic. [de-identified] : Abdominal aortic ultrasound September 3, 2024.  Maximum dimension around 3.2 cm.

## 2024-10-02 NOTE — REASON FOR VISIT
[Symptom and Test Evaluation] : symptom and test evaluation [Hyperlipidemia] : hyperlipidemia [Hypertension] : hypertension [FreeTextEntry3] : Dr. Turner [FreeTextEntry1] : 79-year-old man comes in with his wife for hospital f/u.He underwent revision of RV leads after hospital admission for syncopal event with evidence of RV lead dysfunction on September 10, 2024.  He was admitted to [facility].  And subsequently transferred to [facility] with revision of the device and lead by .  He had no complications.  He has not had any further events since then.  He is here today with his wife.  I have reviewed information from September 10, 2024 from [facility] and [facility]. Activity limited because of hip and knee pain. No chest pain. No PND, orthopnea. No pedal edema. No palpitation, dizziness, or syncopal episode. Stable. Weight. Stable. Diet. No bleeding complications from his anticoagulation No recent hospital admission. Last seen in 2021!!!

## 2024-10-02 NOTE — DISCUSSION/SUMMARY
[FreeTextEntry1] : ALEKSEY MOHR is a 79 year old M who presents today with the above history and the following active issues:  ### Status post RV lead dysfunction.  Requiring revision of the pacemaker.  This was associated with syncope and a fall.  No further problems since revision of RV lead.  Normal functioning device.  Continue to follow. #1  Presumptive diagnosis of ischemic cardiomyopathy with mildly reduced LV ejection fraction.  Abnormal finding on echocardiogram and nuclear myocardial perfusion scan in presence of small area of scarring mainly involving apex without significant symptoms, now with improved and stable LV systolic function. Asymptomatic.  Continue aggressive management for lifestyle and risk factor modifications.  No change in clinical status.  #2NSVT.  Rapid rate. preserved EF.  No signs of unstable CAD.  No relationship with dizziness. #3 paroxysmal atrial fibrillation/flutter/sick sinus syndrome. Status post permanent pacemaker implantation and ablation. On long-term anticoagulation.  High risk medication use.  Follow labs regularly. Permanent pacemaker evaluation in the office. Watch for bleeding. #4 hyperlipidemia. On atorvastatin. Tolerating it well.  stable.  Continue low-dose carbohydrate diet. #5 essential hypertension. No congestive heart failure. No history of renal insufficiency. Follow labs. Considering dizziness.  Lower blood pressure.  In spite of not having orthostatic shifts recommended to stop morning dose of valsartan and see whether that makes a difference.  He has appropriate hydration. #6 preventive care with your office. Counseling regarding low saturated fat, salt and carbohydrate intake was reviewed. Active lifestyle and regular. Exercise along with weight management is advised. #6.  Carotid atherosclerosis.  Multiple risk factors.  Cont statin. #7 Small AAA 3cm, possible mild increase.At present 3.2 cm.  Mild change.  Continue to follow.  Unless there is sudden change or increase dimensions of 5.5 cm no clinical indication for intervention.  Manage blood pressure and cholesterol. .  Emphasized importance of routine clinical followup + DVC followup to avoid complications.  Any questions and concerns were addressed and resolved. Counseling regarding low saturated fat,salt and carbohydrate intake was reviewed. Active lifestyle and regular exercise  along with weight management is advised.   Sincerely,  Des Galvez MD, FAC, TOR

## 2024-10-02 NOTE — PROCEDURE
[Pacemaker] : pacemaker [DDD] : DDD [Threshold Testing Performed] : Threshold testing was performed [___ ms] : [unfilled] ms [None] : none [Counters Reset] : the counters were reset [Lead Imp:  ___ohms] : lead impedance was [unfilled] ohms [Sensing Amplitude ___mv] : sensing amplitude was [unfilled] mv [___V @] : [unfilled] V [de-identified] : Medtronic [de-identified] : Radha LINDSEY DR MRI [id number] [de-identified] : [id number] [de-identified] : 9/11/2024 [de-identified] :  [de-identified] : 5.7years  [de-identified] : AP: 77.9% : 100%  Settings  Atrial  Sensing 0.30mv Amplitude 2.25v Duration 0.4ms  Ventricle Sensing 0.90mv Amplitude 5.0v Duration 0.4ms   No new recorded events OV with Dr. Galvez today PPM interrogation in 3 months Remote monitoring in place  Sincerely,  Suyapa Brown PA-C Patients history, testing and plan reviewed with supervising MD: Dr. Des Galvez

## 2024-10-02 NOTE — ASSESSMENT
[FreeTextEntry1] : past tests for reference: carotid doppler study 2009. mild atherosclerosis Nuclear stress test 10/6/2015 performed with [employer].  Nondiagnostic for ischemia by EKG due to AV pacing at rest.  Normal left ventricular ejection fraction.  Wall motion demonstrated mild hypokinesis in the inferior region and apical region.  No significant change when noted compared to the report on January 24, 2014. Echocardiogram 10/6/2015 be a 60%, normal left atrial and ventricular size, normal right atrial and ventricular size, trace mitral regurgitation, no aortic insufficiency, trace pulmonic insufficiency, trace tricuspid regurgitation.  No significant changes from prior study. EKG ordered and interpreted by me on January 19, 2017. Indication presence of permanent pacemaker. Paroxysmal atrial fibrillation. Into position. AV dual paced rhythm. Echocardiogram. Done on January 7, 2017. LV ejection fraction was 60%  Reviewed on April 10, 2019 Labs from January 2, 2019 was reviewed. Elevated triglycerides were noted. Otherwise, stable, CMP, and LDL cholesterol. Echocardiogram. February 12, 2019. LV ejection fraction low normal to 50%. LVIDD 4.0 cm. There is mild mitral regurgitation. Normal right ventricular size and function. No pericardial effusion. Normal pulmonary pressures.  Reviewed on March 10, 2020 Labs from January 2020, echocardiogram March 4, 2020 and nuclear myocardial perfusion scan March 4, 2020 were reviewed EKG which was reviewed today shows AV sequential pacing.  The reading on the EKG is wrong.  It should be read as AV sequential pacing.  Reviewed on October 8, 2020 Labs from July 20, 2020 were reviewed Pacemaker evaluation today normal functioning noted  Reviewed on December 29, 2021. Labs December 8, 2021.  CBC was stable sodium 142 potassium 4.2 creatinine 0.6 LFT normal LDL 72 HDL 67 triglycerides 117 Pacemaker evaluation.  NSVT.  Maximum rate 194.  12 seconds.  Asymptomatic.  Echo 1/2022 EF 45% aortic root 4.5cm, mild VHD Carotid doppler mild nonobx atherosclerosis Abd US 3cm AAA  8/2024 Device check 1-3 sec asx NSVT. Otherwise normal function. 1.5years battery.  8/6/24 EKG VPACED   Reviewed on September 3, 2024. Echocardiogram abdominal ultrasound and recent labs were reviewed.  Last EKG was reviewed.  Device interrogation done today was reviewed.  Reviewed on October 2, 2024. Reviewed [facility] labs admit note EKG Reviewed [facility] notes including EKGs admit consult note EP notes Device interrogation reviewed

## 2024-10-02 NOTE — PROCEDURE
[Pacemaker] : pacemaker [DDD] : DDD [Threshold Testing Performed] : Threshold testing was performed [___ ms] : [unfilled] ms [None] : none [Counters Reset] : the counters were reset [Lead Imp:  ___ohms] : lead impedance was [unfilled] ohms [Sensing Amplitude ___mv] : sensing amplitude was [unfilled] mv [___V @] : [unfilled] V [de-identified] : Medtronic [de-identified] : Radha LINDSEY DR MRI [id number] [de-identified] : [id number] [de-identified] : 9/11/2024 [de-identified] :  [de-identified] : 5.7years  [de-identified] : AP: 77.9% : 100%  Settings  Atrial  Sensing 0.30mv Amplitude 2.25v Duration 0.4ms  Ventricle Sensing 0.90mv Amplitude 5.0v Duration 0.4ms   No new recorded events OV with Dr. Galvez today PPM interrogation in 3 months Remote monitoring in place  Sincerely,  Suyapa Brown PA-C Patients history, testing and plan reviewed with supervising MD: Dr. Des Galvez

## 2024-10-02 NOTE — PHYSICAL EXAM
[Well Developed] : well developed [Normal Venous Pressure] : normal venous pressure [Normal S1, S2] : normal S1, S2 [Murmur] : murmur [Clear Lung Fields] : clear lung fields [Normal Gait] : normal gait [No Edema] : no edema [Normal Radial B/L] : normal radial B/L [Normal DP B/L] : normal DP B/L [Normal Speech] : normal speech [Alert and Oriented] : alert and oriented [de-identified] : Vital signs today 112/72 right upper extremity sitting, heart rate 90, weight 185 pounds, 95% room air saturation.

## 2024-10-02 NOTE — CARDIOLOGY SUMMARY
[No Ischemia] : no Ischemia [No Symptoms] : no Symptoms [Fixed Defect] : fixed defect [___] : [unfilled] [LVEF ___%] : LVEF [unfilled]% [None] : no pulmonary hypertension [Normal] : normal LA size [Mild] : mild mitral regurgitation [de-identified] : EKG.  August 2024.  Ventricular paced rhythm. [de-identified] : September 3, 2024.  LV ejection fraction 55 to 60%.  Aortic root 3.9 cm trace MR.  Mild TR. [de-identified] : Medtronic.  October 1, 2024.  Device was interrogated.  Normal functioning.  Medtronic. [de-identified] : Abdominal aortic ultrasound September 3, 2024.  Maximum dimension around 3.2 cm.

## 2024-10-02 NOTE — ASSESSMENT
[FreeTextEntry1] : past tests for reference: carotid doppler study 2009. mild atherosclerosis Nuclear stress test 10/6/2015 performed with [employer].  Nondiagnostic for ischemia by EKG due to AV pacing at rest.  Normal left ventricular ejection fraction.  Wall motion demonstrated mild hypokinesis in the inferior region and apical region.  No significant change when noted compared to the report on January 24, 2014. Echocardiogram 10/6/2015 be a 60%, normal left atrial and ventricular size, normal right atrial and ventricular size, trace mitral regurgitation, no aortic insufficiency, trace pulmonic insufficiency, trace tricuspid regurgitation.  No significant changes from prior study. EKG ordered and interpreted by me on January 19, 2017. Indication presence of permanent pacemaker. Paroxysmal atrial fibrillation. Into position. AV dual paced rhythm. Echocardiogram. Done on January 7, 2017. LV ejection fraction was 60%  Reviewed on April 10, 2019 Labs from January 2, 2019 was reviewed. Elevated triglycerides were noted. Otherwise, stable, CMP, and LDL cholesterol. Echocardiogram. February 12, 2019. LV ejection fraction low normal to 50%. LVIDD 4.0 cm. There is mild mitral regurgitation. Normal right ventricular size and function. No pericardial effusion. Normal pulmonary pressures.  Reviewed on March 10, 2020 Labs from January 2020, echocardiogram March 4, 2020 and nuclear myocardial perfusion scan March 4, 2020 were reviewed EKG which was reviewed today shows AV sequential pacing.  The reading on the EKG is wrong.  It should be read as AV sequential pacing.  Reviewed on October 8, 2020 Labs from July 20, 2020 were reviewed Pacemaker evaluation today normal functioning noted  Reviewed on December 29, 2021. Labs December 8, 2021.  CBC was stable sodium 142 potassium 4.2 creatinine 0.6 LFT normal LDL 72 HDL 67 triglycerides 117 Pacemaker evaluation.  NSVT.  Maximum rate 194.  12 seconds.  Asymptomatic.  Echo 1/2022 EF 45% aortic root 4.5cm, mild VHD Carotid doppler mild nonobx atherosclerosis Abd US 3cm AAA  8/2024 Device check 1-3 sec asx NSVT. Otherwise normal function. 1.5years battery.  8/6/24 EKG VPACED   Reviewed on September 3, 2024. Echocardiogram abdominal ultrasound and recent labs were reviewed.  Last EKG was reviewed.  Device interrogation done today was reviewed.  Reviewed on October 2, 2024. Reviewed [facility] labs admit note EKG Reviewed [facility] notes including EKGs admit consult note EP notes Device interrogation reviewed

## 2024-10-02 NOTE — REASON FOR VISIT
[Symptom and Test Evaluation] : symptom and test evaluation [Hyperlipidemia] : hyperlipidemia [Hypertension] : hypertension [FreeTextEntry3] : Dr. Turner [FreeTextEntry1] : 79-year-old man comes in with his wife for hospital f/u.He underwent revision of RV leads after hospital admission for syncopal event with evidence of RV lead dysfunction on September 10, 2024.  He was admitted to [facility].  And subsequently transferred to [facility] with revision of the device and lead by .  He had no complications.  He has not had any further events since then.  He is here today with his wife.  I have reviewed information from September 10, 2024 from [facility] and [facility]. Activity limited because of hip and knee pain. No chest pain. No PND, orthopnea. No pedal edema. No palpitation, dizziness, or syncopal episode. Stable. Weight. Stable. Diet. No bleeding complications from his anticoagulation No recent hospital admission. Last seen in 2021!!!

## 2024-10-02 NOTE — PHYSICAL EXAM
[Well Developed] : well developed [Normal Venous Pressure] : normal venous pressure [Normal S1, S2] : normal S1, S2 [Murmur] : murmur [Clear Lung Fields] : clear lung fields [Normal Gait] : normal gait [No Edema] : no edema [Normal Radial B/L] : normal radial B/L [Normal DP B/L] : normal DP B/L [Normal Speech] : normal speech [Alert and Oriented] : alert and oriented [de-identified] : Vital signs today 112/72 right upper extremity sitting, heart rate 90, weight 185 pounds, 95% room air saturation.

## 2024-12-11 ENCOUNTER — NON-APPOINTMENT (OUTPATIENT)
Age: 79
End: 2024-12-11

## 2024-12-11 ENCOUNTER — APPOINTMENT (OUTPATIENT)
Dept: CARDIOLOGY | Facility: CLINIC | Age: 79
End: 2024-12-11
Payer: MEDICARE

## 2024-12-11 PROCEDURE — 93294 REM INTERROG EVL PM/LDLS PM: CPT

## 2024-12-11 PROCEDURE — 93296 REM INTERROG EVL PM/IDS: CPT

## 2025-01-08 ENCOUNTER — NON-APPOINTMENT (OUTPATIENT)
Age: 80
End: 2025-01-08

## 2025-01-08 ENCOUNTER — APPOINTMENT (OUTPATIENT)
Dept: CARDIOLOGY | Facility: CLINIC | Age: 80
End: 2025-01-08
Payer: MEDICARE

## 2025-01-08 VITALS
WEIGHT: 186 LBS | OXYGEN SATURATION: 96 % | DIASTOLIC BLOOD PRESSURE: 78 MMHG | HEIGHT: 73 IN | HEART RATE: 83 BPM | BODY MASS INDEX: 24.65 KG/M2 | SYSTOLIC BLOOD PRESSURE: 110 MMHG

## 2025-01-08 DIAGNOSIS — E78.2 MIXED HYPERLIPIDEMIA: ICD-10-CM

## 2025-01-08 DIAGNOSIS — I48.0 PAROXYSMAL ATRIAL FIBRILLATION: ICD-10-CM

## 2025-01-08 DIAGNOSIS — Z79.01 LONG TERM (CURRENT) USE OF ANTICOAGULANTS: ICD-10-CM

## 2025-01-08 DIAGNOSIS — I65.23 OCCLUSION AND STENOSIS OF BILATERAL CAROTID ARTERIES: ICD-10-CM

## 2025-01-08 DIAGNOSIS — Z95.0 PRESENCE OF CARDIAC PACEMAKER: ICD-10-CM

## 2025-01-08 DIAGNOSIS — I77.810 THORACIC AORTIC ECTASIA: ICD-10-CM

## 2025-01-08 PROCEDURE — 99214 OFFICE O/P EST MOD 30 MIN: CPT

## 2025-01-08 PROCEDURE — G2211 COMPLEX E/M VISIT ADD ON: CPT

## 2025-03-14 ENCOUNTER — NON-APPOINTMENT (OUTPATIENT)
Age: 80
End: 2025-03-14

## 2025-03-14 ENCOUNTER — APPOINTMENT (OUTPATIENT)
Dept: CARDIOLOGY | Facility: CLINIC | Age: 80
End: 2025-03-14
Payer: MEDICARE

## 2025-03-14 PROCEDURE — 93294 REM INTERROG EVL PM/LDLS PM: CPT

## 2025-03-14 PROCEDURE — 93296 REM INTERROG EVL PM/IDS: CPT

## 2025-06-17 ENCOUNTER — APPOINTMENT (OUTPATIENT)
Dept: CARDIOLOGY | Facility: CLINIC | Age: 80
End: 2025-06-17

## 2025-07-09 ENCOUNTER — APPOINTMENT (OUTPATIENT)
Dept: CARDIOLOGY | Facility: CLINIC | Age: 80
End: 2025-07-09
Payer: MEDICARE

## 2025-07-09 VITALS
WEIGHT: 194 LBS | HEART RATE: 84 BPM | OXYGEN SATURATION: 92 % | BODY MASS INDEX: 25.71 KG/M2 | SYSTOLIC BLOOD PRESSURE: 132 MMHG | HEIGHT: 73 IN | DIASTOLIC BLOOD PRESSURE: 78 MMHG

## 2025-07-09 PROCEDURE — 99214 OFFICE O/P EST MOD 30 MIN: CPT

## 2025-07-09 PROCEDURE — 93000 ELECTROCARDIOGRAM COMPLETE: CPT

## 2025-07-09 PROCEDURE — G2211 COMPLEX E/M VISIT ADD ON: CPT

## 2025-07-25 ENCOUNTER — APPOINTMENT (OUTPATIENT)
Dept: CARDIOLOGY | Facility: CLINIC | Age: 80
End: 2025-07-25

## 2025-09-12 ENCOUNTER — APPOINTMENT (OUTPATIENT)
Dept: CARDIOLOGY | Facility: CLINIC | Age: 80
End: 2025-09-12